# Patient Record
Sex: MALE | Race: BLACK OR AFRICAN AMERICAN | Employment: UNEMPLOYED | URBAN - METROPOLITAN AREA
[De-identification: names, ages, dates, MRNs, and addresses within clinical notes are randomized per-mention and may not be internally consistent; named-entity substitution may affect disease eponyms.]

---

## 2017-01-01 ENCOUNTER — OFFICE VISIT (OUTPATIENT)
Dept: CARDIOLOGY CLINIC | Age: 72
End: 2017-01-01

## 2017-01-01 VITALS — HEART RATE: 70 BPM | HEIGHT: 73 IN | DIASTOLIC BLOOD PRESSURE: 48 MMHG | SYSTOLIC BLOOD PRESSURE: 108 MMHG

## 2017-01-01 DIAGNOSIS — I44.2 ATRIOVENTRICULAR BLOCK, COMPLETE (HCC): ICD-10-CM

## 2017-01-01 DIAGNOSIS — Z95.0 CARDIAC PACEMAKER: ICD-10-CM

## 2017-01-01 DIAGNOSIS — I10 ESSENTIAL HYPERTENSION, BENIGN: ICD-10-CM

## 2017-01-01 DIAGNOSIS — I42.9 CARDIOMYOPATHY, UNSPECIFIED TYPE (HCC): Primary | ICD-10-CM

## 2017-01-01 DIAGNOSIS — Z71.89 DNR (DO NOT RESUSCITATE) DISCUSSION: ICD-10-CM

## 2017-01-01 RX ORDER — CARVEDILOL 3.12 MG/1
3.12 TABLET ORAL 2 TIMES DAILY WITH MEALS
Qty: 60 TAB | Refills: 3 | Status: SHIPPED | OUTPATIENT
Start: 2017-01-01

## 2017-03-02 LAB — LDL-C, EXTERNAL: 70

## 2017-03-10 ENCOUNTER — OFFICE VISIT (OUTPATIENT)
Dept: CARDIOLOGY CLINIC | Age: 72
End: 2017-03-10

## 2017-03-10 VITALS
HEIGHT: 73 IN | SYSTOLIC BLOOD PRESSURE: 168 MMHG | BODY MASS INDEX: 24.52 KG/M2 | HEART RATE: 70 BPM | WEIGHT: 185 LBS | DIASTOLIC BLOOD PRESSURE: 82 MMHG

## 2017-03-10 DIAGNOSIS — I50.32 CHRONIC DIASTOLIC HEART FAILURE (HCC): Primary | ICD-10-CM

## 2017-03-10 DIAGNOSIS — Z95.0 CARDIAC PACEMAKER: ICD-10-CM

## 2017-03-10 DIAGNOSIS — I10 ESSENTIAL HYPERTENSION, BENIGN: ICD-10-CM

## 2017-03-10 DIAGNOSIS — R55 SYNCOPE, UNSPECIFIED SYNCOPE TYPE: ICD-10-CM

## 2017-03-10 RX ORDER — SODIUM BICARBONATE 650 MG/1
650 TABLET ORAL 2 TIMES DAILY
COMMUNITY

## 2017-03-10 NOTE — PROGRESS NOTES
HISTORY OF PRESENT ILLNESS  Magali Lee is a 73 y.o. male. HPI Comments: 3/17 seen due to syncopal episodes. Not remembered by pt. Lasting about 10 mts. Had good BP and sugar per wife. No associated symptoms. Wife can't remember if pt was stiff or flaccid. No incontinence. No tongue biting. No witnessed shaking  Patient denies significant chest pain, SOB, palpitations, edema, dizziness      CHF   The history is provided by the medical records. This is a chronic problem. Pertinent negatives include no chest pain, no abdominal pain, no headaches and no shortness of breath. Hypertension   The history is provided by the medical records. This is a chronic problem. Pertinent negatives include no chest pain, no abdominal pain, no headaches and no shortness of breath. Cholesterol Problem   The history is provided by the medical records. This is a chronic problem. Pertinent negatives include no chest pain, no abdominal pain, no headaches and no shortness of breath. Hypotension   The history is provided by the patient and medical records (last o/v and low nl today again; 11/16 resolved). Pertinent negatives include no chest pain, no abdominal pain, no headaches and no shortness of breath. Review of Systems   Constitutional: Negative for chills, diaphoresis, fever, malaise/fatigue and weight loss. HENT: Negative for ear pain and hearing loss. Eyes: Negative for blurred vision. Respiratory: Negative for cough, hemoptysis, sputum production, shortness of breath, wheezing and stridor. Cardiovascular: Negative for chest pain, palpitations, orthopnea, claudication, leg swelling, syncope and PND. Gastrointestinal: Negative for abdominal pain, heartburn, nausea and vomiting. Musculoskeletal: Negative for myalgias and neck pain. Skin: Negative for rash. Neurological: Negative for dizziness, tingling, tremors, focal weakness, loss of consciousness, weakness and headaches.         Not responding appropriately   Psychiatric/Behavioral: Negative for depression and suicidal ideas. Allergies   Allergen Reactions    Shellfish Derived Not Reported This Time       Past Medical History:   Diagnosis Date    Atrioventricular block, complete ([address]     Cardiac pacemaker     Cerebrovascular disease     1995, 2003-residual left hemiparesis    Chronic diastolic heart failure ([address]     Congestive heart failure ([address]     Essential hypertension     Essential hypertension, benign     Hypertension     Myocardial infarct ([address] 1993, 2004    Other and unspecified hyperlipidemia 7/11    low density lipoproteins (LDLs) are at goal, triglycerides are at goal, high density lipoproteins (HDLs) are at goal.    Renal insufficiency     Shortness of breath     Stroke ([address]     1995, 2003-residual left hemiparesis    Type II or unspecified type diabetes mellitus without mention of complication, not stated as uncontrolled        Family History   Problem Relation Age of Onset    Stroke Mother 39     cerebrovascular disease; cause of death was a cerebrovascular accident    Stroke Father 61    Heart Attack Neg Hx        Social History   Substance Use Topics    Smoking status: Former Smoker     Quit date: 7/14/2009    Smokeless tobacco: Never Used    Alcohol use No        Current Outpatient Prescriptions   Medication Sig    sodium bicarbonate 650 mg tablet Take 650 mg by mouth two (2) times a day.  bisacodyl (DULCOLAX) 10 mg suppository Insert 10 mg into rectum as needed.  NA PHOS,M-B/NA PHOS,DI-BA (ENEMA DISPOSABLE RE) Insert  into rectum.  ferrous sulfate 324 mg (65 mg iron) tablet Take  by mouth Daily (before breakfast).  azithromycin (AZASITE) 1 % ophthalmic solution Administer 1 Drop to both eyes two (2) times a day.  atorvastatin (LIPITOR) 10 mg tablet Take  by mouth daily.     polyvinyl alcohol (LIQUIFILM TEARS) 1.4 % ophthalmic solution Administer 2 Drops to both eyes three (3) times daily as needed.  trimethoprim-polymyxin b (POLYTRIM) ophthalmic solution Administer 1 Drop to both eyes three (3) times daily.  tetrahydrozoline-peg 0.05-1 % drop Apply  to eye four (4) times daily.  albuterol-ipratropium (DUO-NEB) 2.5 mg-0.5 mg/3 ml nebu 3 mL by Nebulization route.  Cholecalciferol, Vitamin D3, (VITAMIN D3) 1,000 unit cap Take  by mouth. 2 tablets daily    insulin detemir (LEVEMIR) 100 unit/mL injection 48 Units by SubCUTAneous route nightly.  allopurinol (ZYLOPRIM) 100 mg tablet Take  by mouth daily.  latanoprost (XALATAN) 0.005 % ophthalmic solution Administer 1 Drop to both eyes nightly.  aspirin 81 mg tablet Take 81 mg by mouth.  timolol (TIMOPTIC OCUDOSE, PF,) 0.5 % Dpet Administer 1 Drop to both eyes two (2) times a day.  paricalcitol (ZEMPLAR) 1 mcg capsule Take 1 mcg by mouth daily.  predniSONE (DELTASONE) 2.5 mg tablet Take 2.5 mg by mouth daily (with breakfast).  ezetimibe (ZETIA) 10 mg tablet Take 10 mg by mouth daily.  acetaminophen (TYLENOL) 325 mg tablet Take 500 mg by mouth two (2) times a day. No current facility-administered medications for this visit. Past Surgical History:   Procedure Laterality Date    CARDIAC SURG PROCEDURE UNLIST      cardiac pacemaker; please get remote checks for pacer or ICD every 3 months and office check in 1 year    HX PACEMAKER  2003; gen change 4/14       Diagnostic Studies: Old records reviewed and show as follows  EKG tracings reviewed by me today. CARDIOLOGY STUDIES 2/20/2014 4/21/2007   Myocardial Perfusion Scan Result - nl scan, mild partial reversible defect of inferior wall, EF 77%   Echocardiogram - Complete Result 60%EF, sev LVH, mild dil LA/RA/RV 4/07 EF 55%, mod DD; 4/07 EF 50%       Visit Vitals    /82    Pulse 70    Ht 6' 1\" (1.854 m)    Wt 83.9 kg (185 lb)    BMI 24.41 kg/m2       Mr. Gayle Cordero has a reminder for a \"due or due soon\" health maintenance.  I have asked that he contact his primary care provider for follow-up on this health maintenance. Physical Exam   Constitutional: He is oriented to person, place, and time. He appears well-developed and well-nourished. No distress. Wheel chair bound   HENT:   Head: Normocephalic and atraumatic. Eyes: Right eye exhibits no discharge. Left eye exhibits no discharge. No scleral icterus. Neck: Neck supple. No JVD present. Carotid bruit is not present. No thyromegaly present. Cardiovascular: Normal rate, regular rhythm, S1 normal, S2 normal, normal heart sounds and intact distal pulses. Exam reveals no gallop and no friction rub. No murmur heard. Pulmonary/Chest: Effort normal. He has no wheezes. He has rales (diffuse b/l scattered). Abdominal: Soft. He exhibits no mass. There is no tenderness. Musculoskeletal: He exhibits no edema. Neurological: He is alert and oriented to person, place, and time. Left suzanne   Skin: Skin is warm and dry. No rash noted. Psychiatric: He has a normal mood and affect. His behavior is normal.       ASSESSMENT and Edita Gutierrezlucho was seen today for chf, hypertension and cholesterol problem. Diagnoses and all orders for this visit:    Chronic diastolic heart failure ([address]  Comments:  w/c bound  Orders:  -     2D ECHO COMPLETE ADULT (TTE); Future    Cardiac pacemaker-DDD  Comments:  3/17(rare NSVT-longest for 10 secs) nl function    Orders:  -     PROGRAM EVAL (IN PERSON) IMPLANT DEVICE,PACEMAKER,MULT LEAD    Essential hypertension, benign  Comments:  3/17 get BP bid at [facility] and decide in 1-2 wks for meds; 11/16 no meds now for HTN    Syncope, unspecified syncope type  Comments:  3/17 unlikely cardiac as BP good during/just after episode and no arrhythmias noted on pacer check today-check echo  meds for seizures per Dr Ana Maria Camarena  Orders:  -     2D ECHO COMPLETE ADULT (TTE); Future        Pertinent laboratory and test data reviewed and discussed with patient.   See patient instructions also for other medical advice given    Medications Discontinued During This Encounter   Medication Reason    magnesium oxide (MAG-OX) 400 mg tablet Discontinued by Another Clinician       Follow-up Disposition:  Return in about 3 months (around 6/10/2017), or if symptoms worsen or fail to improve, for post test.

## 2017-03-10 NOTE — MR AVS SNAPSHOT
Visit Information Date & Time Provider Department Dept. Phone Encounter #  
 3/10/2017 10:45 AM Thad Wang MD [phone] Follow-up Instructions Return in about 3 months (around 6/10/2017), or if symptoms worsen or fail to improve, for post test.  
  
Your Appointments 3/20/2017 12:45 PM  
PROCEDURE with [place] ECHO [facility] Appt Note: Echo/[place] [address] [facility] [address]  
  
   
 [address] [id number] [address] [address]  
  
    
 6/16/2017 10:45 AM  
ESTABLISHED PATIENT with Thad Wang MD  
[facility] Appt Note: 3 month follow up post echo  
 [address] [facility] [address]  
  
   
 [address] [id number] [address] [address] Upcoming Health Maintenance Date Due Hepatitis C Screening 1945 HEMOGLOBIN A1C Q6M 1945 FOOT EXAM Q1 7/18/1957 MICROALBUMIN Q1 7/18/1957 EYE EXAM RETINAL OR DILATED Q1 7/18/1957 DTaP/Tdap/Td series (1 - Tdap) 7/18/1968 FOBT Q 1 YEAR AGE 50-75 7/18/1997 ZOSTER VACCINE AGE 60> 7/18/2007 GLAUCOMA SCREENING Q2Y 7/18/2012 Pneumococcal 65+ Low/Medium Risk (1 of 2 - PCV13) 7/18/2012 MEDICARE YEARLY EXAM 7/18/2012 LIPID PANEL Q1 2/23/2016 INFLUENZA AGE 9 TO ADULT 8/1/2016 Allergies as of 3/10/2017  Review Complete On: 3/10/2017 By: Thad Wang MD  
  
 Severity Noted Reaction Type Reactions Shellfish Derived    Not Reported This Time Current Immunizations  Never Reviewed No immunizations on file. Not reviewed this visit You Were Diagnosed With   
  
 Codes Comments Chronic diastolic heart failure (HCC)    -  Primary ICD-10-CM: I50.32 
ICD-9-CM: 428.32 w/c bound Cardiac pacemaker     ICD-10-CM: Z95.0 ICD-9-CM: V45.01 3/17(rare NSVT-longest for 10 secs) nl function Essential hypertension, benign     ICD-10-CM: I10 
ICD-9-CM: 401.1 3/17 get BP bid at [facility] and decide in 1-2 wks for meds; 11/16 no meds now for HTN Syncope, unspecified syncope type     ICD-10-CM: R55 
ICD-9-CM: 780.2 3/17 unlikely cardiac as BP good during/just after episode and no arrhythmias noted on pacer check today-check echo 
meds for seizures per Dr Louis Talley Vitals BP Pulse Height(growth percentile) Weight(growth percentile) BMI Smoking Status 168/82 70 6' 1\" (1.854 m) 185 lb (83.9 kg) 24.41 kg/m2 Former Smoker Vitals History BMI and BSA Data Body Mass Index Body Surface Area  
 24.41 kg/m 2 2.08 m 2 Preferred Pharmacy Pharmacy Name Phone [address], [address] [address] [phone] Your Updated Medication List  
  
   
This list is accurate as of: 3/10/17 11:26 AM.  Always use your most recent med list.  
  
  
  
  
 albuterol-ipratropium 2.5 mg-0.5 mg/3 ml Nebu Commonly known as:  DUO-NEB  
3 mL by Nebulization route. allopurinol 100 mg tablet Commonly known as:  Ollen Epley Take  by mouth daily. aspirin 81 mg tablet Take 81 mg by mouth. atorvastatin 10 mg tablet Commonly known as:  LIPITOR Take  by mouth daily. azithromycin 1 % ophthalmic solution Commonly known as:  Angy Montes De Oca Administer 1 Drop to both eyes two (2) times a day. bisacodyl 10 mg suppository Commonly known as:  DULCOLAX Insert 10 mg into rectum as needed. ENEMA DISPOSABLE RE Insert  into rectum. ferrous sulfate 324 mg (65 mg iron) tablet Take  by mouth Daily (before breakfast). LEVEMIR 100 unit/mL injection Generic drug:  insulin detemir 48 Units by SubCUTAneous route nightly. polyvinyl alcohol 1.4 % ophthalmic solution Commonly known as:  Ananda Sarkar Administer 2 Drops to both eyes three (3) times daily as needed. predniSONE 2.5 mg tablet Commonly known as:  Paulanetta Moment Take 2.5 mg by mouth daily (with breakfast). sodium bicarbonate 650 mg tablet Take 650 mg by mouth two (2) times a day. tetrahydrozoline-peg 0.05-1 % Drop Apply  to eye four (4) times daily. TIMOPTIC OCUDOSE (PF) 0.5 % Dpet Generic drug:  timolol Administer 1 Drop to both eyes two (2) times a day. trimethoprim-polymyxin b ophthalmic solution Commonly known as:  POLYTRIM Administer 1 Drop to both eyes three (3) times daily. TYLENOL 325 mg tablet Generic drug:  acetaminophen Take 500 mg by mouth two (2) times a day. VITAMIN D3 1,000 unit Cap Generic drug:  cholecalciferol Take  by mouth. 2 tablets daily XALATAN 0.005 % ophthalmic solution Generic drug:  latanoprost  
Administer 1 Drop to both eyes nightly. ZEMPLAR 1 mcg capsule Generic drug:  paricalcitol Take 1 mcg by mouth daily. ZETIA 10 mg tablet Generic drug:  ezetimibe Take 10 mg by mouth daily. We Performed the Following PROGRAM EVAL (IN PERSON) IMPLANT DEVICE,PACEMAKER,MULT LEAD [id number] CPT(R)] Follow-up Instructions Return in about 3 months (around 6/10/2017), or if symptoms worsen or fail to improve, for post test.  
  
To-Do List   
 Around 03/13/2017 Cardiac Services:  2D ECHO COMPLETE ADULT (TTE) Patient Instructions Medications Discontinued During This Encounter Medication Reason  magnesium oxide (MAG-OX) 400 mg tablet Discontinued by Another Clinician Orders Placed This Encounter  2D ECHO COMPLETE ADULT (TTE) Standing Status:   Future Standing Expiration Date:   9/6/2017 Order Specific Question:   Reason for Exam: Answer:   syncope  PROGRAM EVAL (IN PERSON) IMPLANT DEVICE,PACEMAKER,MULT LEAD Order Specific Question:   Reason for Exam: Answer:   f/u pacer After the recommended changes have been made in blood pressure medicines, patient advised to keep BP/HR(pulse rate) chart twice daily and bring us results in next 2 weeks or so. Patient may send the results via \"My Chart\" if desired. Please rest for 5-10 minutes before checking blood pressure Fainting: Care Instructions Your Care Instructions When you faint, or pass out, you lose consciousness for a short time. A brief drop in blood flow to the brain often causes it. When you fall or lie down, more blood flows to your brain and you regain consciousness. Emotional stress, pain, or overheatingespecially if you have been standingcan make you faint. In these cases, fainting is usually not serious. But fainting can be a sign of a more serious problem. Your doctor may want you to have more tests to rule out other causes. The treatment you need depends on the reason why you fainted. The doctor has checked you carefully, but problems can develop later. If you notice any problems or new symptoms, get medical treatment right away. Follow-up care is a key part of your treatment and safety. Be sure to make and go to all appointments, and call your doctor if you are having problems. It's also a good idea to know your test results and keep a list of the medicines you take. How can you care for yourself at home? · Drink plenty of fluids to prevent dehydration. If you have kidney, heart, or liver disease and have to limit fluids, talk with your doctor before you increase your fluid intake. When should you call for help? Call 911 anytime you think you may need emergency care. For example, call if: 
· You have symptoms of a heart problem. These may include: ¨ Chest pain or pressure. ¨ Severe trouble breathing. ¨ A fast or irregular heartbeat. ¨ Lightheadedness or sudden weakness. ¨ Coughing up pink, foamy mucus. ¨ Passing out. After you call 911, the  may tell you to chew 1 adult-strength or 2 to 4 low-dose aspirin. Wait for an ambulance. Do not try to drive yourself. · You have symptoms of a stroke. These may include: 
¨ Sudden numbness, tingling, weakness, or loss of movement in your face, arm, or leg, especially on only one side of your body. ¨ Sudden vision changes. ¨ Sudden trouble speaking. ¨ Sudden confusion or trouble understanding simple statements. ¨ Sudden problems with walking or balance. ¨ A sudden, severe headache that is different from past headaches. · You passed out (lost consciousness) again. Watch closely for changes in your health, and be sure to contact your doctor if: 
· You do not get better as expected. Where can you learn more? Go to http://nimco-madan.info/. Enter R209 in the search box to learn more about \"Fainting: Care Instructions. \" Current as of: May 27, 2016 Content Version: 11.1 © 9417-2651 [employer]wise, [employer]orporated. Care instructions adapted under license by [employer] (which disclaims liability or warranty for this information). If you have questions about a medical condition or this instruction, always ask your healthcare professional. [place] [address] any warranty or liability for your use of this information. Introducing [facility]! [facility] introduces [employer]oster patient portal. Now you can access parts of your medical record, email your doctor's office, and request medication refills online. 1. In your internet browser, go to [email or web address] 2. Click on the First Time User? Click Here link in the Sign In box. You will see the New Member Sign Up page. 3. Enter your PodPoster Access Code exactly as it appears below. You will not need to use this code after youve completed the sign-up process. If you do not sign up before the expiration date, you must request a new code. · PodPoster Access Code: [email or web address] Expires: 6/8/2017 10:08 AM 
 
4.  Enter the last four digits of your Social Security Number (xxxx) and Date of Birth (mm/dd/yyyy) as indicated and click Submit. You will be taken to the next sign-up page. 5. Create a [employer] ID. This will be your [employer] login ID and cannot be changed, so think of one that is secure and easy to remember. 6. Create a [employer] password. You can change your password at any time. 7. Enter your Password Reset Question and Answer. This can be used at a later time if you forget your password. 8. Enter your e-mail address. You will receive e-mail notification when new information is available in [address] 9. Click Sign Up. You can now view and download portions of your medical record. 10. Click the Download Summary menu link to download a portable copy of your medical information. If you have questions, please visit the Frequently Asked Questions section of the [employer] website. Remember, [employer] is NOT to be used for urgent needs. For medical emergencies, dial 911. Now available from your iPhone and Android! Please provide this summary of care documentation to your next provider. Your primary care clinician is listed as Lesvia Samuel. If you have any questions after today's visit, please call [phone].

## 2017-03-10 NOTE — PROGRESS NOTES
1. Have you been to the ER, urgent care clinic since your last visit? Hospitalized since your last visit?     no    2. Have you seen or consulted any other health care providers outside of the [address] [place] [address] since your last visit? Include any pap smears or colon screening. Yes kidney dr, pcp see pt in nursing home    3. Since your last visit, have you had any of the following symptoms? No cardiac symptoms    4. Have you had any blood work, X-rays or cardiac testing? Yes, facility            5.  Where do you normally have your labs drawn? Facility     6. Do you need any refills today?    no

## 2017-03-10 NOTE — LETTER
Fredy Flatten 1945 
 
3/10/2017 Dear MD Sarai Danielle MD 
 
I had the pleasure of evaluating  Mr. Edi Mcdonough in office today. Below are the relevant portions of my assessment and plan of care. ICD-10-CM ICD-9-CM 1. Chronic diastolic heart failure (HCC) I50.32 428.32 2D ECHO COMPLETE ADULT (TTE)  
 w/c bound 2. Cardiac pacemaker-DDD Z95.0 V45.01 PROGRAM EVAL (IN PERSON) IMPLANT DEVICE,PACEMAKER,MULT LEAD  
 3/17(rare NSVT-longest for 10 secs) nl function 3. Essential hypertension, benign I10 401.1 3/17 get BP bid at [facility] and decide in 1-2 wks for meds; 11/16 no meds now for HTN 4. Syncope, unspecified syncope type R55 780.2 2D ECHO COMPLETE ADULT (TTE)  
 3/17 unlikely cardiac as BP good during/just after episode and no arrhythmias noted on pacer check today-check echo 
meds for seizures per Dr Mindi Giron Current Outpatient Prescriptions Medication Sig Dispense Refill  sodium bicarbonate 650 mg tablet Take 650 mg by mouth two (2) times a day.  bisacodyl (DULCOLAX) 10 mg suppository Insert 10 mg into rectum as needed.  NA PHOS,M-B/NA PHOS,DI-BA (ENEMA DISPOSABLE RE) Insert  into rectum.  ferrous sulfate 324 mg (65 mg iron) tablet Take  by mouth Daily (before breakfast).  azithromycin (AZASITE) 1 % ophthalmic solution Administer 1 Drop to both eyes two (2) times a day.  atorvastatin (LIPITOR) 10 mg tablet Take  by mouth daily.  polyvinyl alcohol (LIQUIFILM TEARS) 1.4 % ophthalmic solution Administer 2 Drops to both eyes three (3) times daily as needed.  trimethoprim-polymyxin b (POLYTRIM) ophthalmic solution Administer 1 Drop to both eyes three (3) times daily.  tetrahydrozoline-peg 0.05-1 % drop Apply  to eye four (4) times daily.  albuterol-ipratropium (DUO-NEB) 2.5 mg-0.5 mg/3 ml nebu 3 mL by Nebulization route.  Cholecalciferol, Vitamin D3, (VITAMIN D3) 1,000 unit cap Take  by mouth. 2 tablets daily  insulin detemir (LEVEMIR) 100 unit/mL injection 48 Units by SubCUTAneous route nightly.  allopurinol (ZYLOPRIM) 100 mg tablet Take  by mouth daily.  latanoprost (XALATAN) 0.005 % ophthalmic solution Administer 1 Drop to both eyes nightly.  aspirin 81 mg tablet Take 81 mg by mouth.  timolol (TIMOPTIC OCUDOSE, PF,) 0.5 % Dpet Administer 1 Drop to both eyes two (2) times a day.  paricalcitol (ZEMPLAR) 1 mcg capsule Take 1 mcg by mouth daily.  predniSONE (DELTASONE) 2.5 mg tablet Take 2.5 mg by mouth daily (with breakfast).  ezetimibe (ZETIA) 10 mg tablet Take 10 mg by mouth daily.  acetaminophen (TYLENOL) 325 mg tablet Take 500 mg by mouth two (2) times a day. Orders Placed This Encounter  2D ECHO COMPLETE ADULT (TTE) Standing Status:   Future Standing Expiration Date:   9/6/2017 Order Specific Question:   Reason for Exam: Answer:   syncope  PROGRAM EVAL (IN PERSON) IMPLANT DEVICE,PACEMAKER,MULT LEAD Order Specific Question:   Reason for Exam: Answer:   f/u pacer  sodium bicarbonate 650 mg tablet Sig: Take 650 mg by mouth two (2) times a day. If you have questions, please do not hesitate to call me. I look forward to following Mr. Idalmis Sweeney along with you. Sincerely, Beryl Chappell MD

## 2017-03-10 NOTE — PATIENT INSTRUCTIONS
Medications Discontinued During This Encounter   Medication Reason    magnesium oxide (MAG-OX) 400 mg tablet Discontinued by Another Clinician       Orders Placed This Encounter    2D ECHO COMPLETE ADULT (TTE)     Standing Status:   Future     Standing Expiration Date:   9/6/2017     Order Specific Question:   Reason for Exam:     Answer:   syncope    PROGRAM EVAL (IN PERSON) IMPLANT DEVICE,PACEMAKER,MULT LEAD     Order Specific Question:   Reason for Exam:     Answer:   f/u pacer     After the recommended changes have been made in blood pressure medicines, patient advised to keep BP/HR(pulse rate) chart twice daily and bring us results in next 2 weeks or so. Patient may send the results via \"My Chart\" if desired. Please rest for 5-10 minutes before checking blood pressure       Fainting: Care Instructions  Your Care Instructions    When you faint, or pass out, you lose consciousness for a short time. A brief drop in blood flow to the brain often causes it. When you fall or lie down, more blood flows to your brain and you regain consciousness. Emotional stress, pain, or overheatingespecially if you have been standingcan make you faint. In these cases, fainting is usually not serious. But fainting can be a sign of a more serious problem. Your doctor may want you to have more tests to rule out other causes. The treatment you need depends on the reason why you fainted. The doctor has checked you carefully, but problems can develop later. If you notice any problems or new symptoms, get medical treatment right away. Follow-up care is a key part of your treatment and safety. Be sure to make and go to all appointments, and call your doctor if you are having problems. It's also a good idea to know your test results and keep a list of the medicines you take. How can you care for yourself at home? · Drink plenty of fluids to prevent dehydration.  If you have kidney, heart, or liver disease and have to limit fluids, talk with your doctor before you increase your fluid intake. When should you call for help? Call 911 anytime you think you may need emergency care. For example, call if:  · You have symptoms of a heart problem. These may include:  ¨ Chest pain or pressure. ¨ Severe trouble breathing. ¨ A fast or irregular heartbeat. ¨ Lightheadedness or sudden weakness. ¨ Coughing up pink, foamy mucus. ¨ Passing out. After you call 911, the  may tell you to chew 1 adult-strength or 2 to 4 low-dose aspirin. Wait for an ambulance. Do not try to drive yourself. · You have symptoms of a stroke. These may include:  ¨ Sudden numbness, tingling, weakness, or loss of movement in your face, arm, or leg, especially on only one side of your body. ¨ Sudden vision changes. ¨ Sudden trouble speaking. ¨ Sudden confusion or trouble understanding simple statements. ¨ Sudden problems with walking or balance. ¨ A sudden, severe headache that is different from past headaches. · You passed out (lost consciousness) again. Watch closely for changes in your health, and be sure to contact your doctor if:  · You do not get better as expected. Where can you learn more? Go to http://nimco-madan.info/. Enter M438 in the search box to learn more about \"Fainting: Care Instructions. \"  Current as of: May 27, 2016  Content Version: 11.1  © 2845-1244 [employer]. Care instructions adapted under license by [employer] (which disclaims liability or warranty for this information). If you have questions about a medical condition or this instruction, always ask your healthcare professional. [place] [address] any warranty or liability for your use of this information.

## 2017-03-20 ENCOUNTER — CLINICAL SUPPORT (OUTPATIENT)
Dept: CARDIOLOGY CLINIC | Age: 72
End: 2017-03-20

## 2017-03-20 DIAGNOSIS — I50.32 CHRONIC DIASTOLIC HEART FAILURE (HCC): ICD-10-CM

## 2017-03-20 DIAGNOSIS — R55 SYNCOPE, UNSPECIFIED SYNCOPE TYPE: ICD-10-CM

## 2017-04-21 ENCOUNTER — OFFICE VISIT (OUTPATIENT)
Dept: CARDIOLOGY CLINIC | Age: 72
End: 2017-04-21

## 2017-04-21 VITALS — DIASTOLIC BLOOD PRESSURE: 72 MMHG | HEART RATE: 67 BPM | SYSTOLIC BLOOD PRESSURE: 154 MMHG | HEIGHT: 73 IN

## 2017-04-21 DIAGNOSIS — R55 SYNCOPE, UNSPECIFIED SYNCOPE TYPE: ICD-10-CM

## 2017-04-21 DIAGNOSIS — Z95.0 CARDIAC PACEMAKER: ICD-10-CM

## 2017-04-21 DIAGNOSIS — Z71.89 DNR (DO NOT RESUSCITATE) DISCUSSION: ICD-10-CM

## 2017-04-21 DIAGNOSIS — I42.9 CARDIOMYOPATHY, UNSPECIFIED TYPE (HCC): Primary | ICD-10-CM

## 2017-04-21 RX ORDER — ADHESIVE BANDAGE
30 BANDAGE TOPICAL DAILY PRN
COMMUNITY

## 2017-04-21 RX ORDER — LISINOPRIL 2.5 MG/1
2.5 TABLET ORAL DAILY
Qty: 30 TAB | Refills: 1 | Status: SHIPPED | OUTPATIENT
Start: 2017-04-21

## 2017-04-21 RX ORDER — LISINOPRIL 2.5 MG/1
2.5 TABLET ORAL DAILY
Qty: 30 TAB | Refills: 1 | Status: SHIPPED | OUTPATIENT
Start: 2017-04-21 | End: 2017-04-21 | Stop reason: SDUPTHER

## 2017-04-21 NOTE — PROGRESS NOTES
HISTORY OF PRESENT ILLNESS  Luh Yanez is a 73 y.o. male. HPI Comments: 3/17 seen due to syncopal episodes. Not remembered by pt. Lasting about 10 mts. Had good BP and sugar per wife. No associated symptoms. Wife can't remember if pt was stiff or flaccid. No incontinence. No tongue biting. No witnessed shaking  Patient denies significant chest pain, SOB, palpitations, edema, dizziness      CHF   The history is provided by the medical records. This is a chronic problem. Pertinent negatives include no chest pain, no abdominal pain, no headaches and no shortness of breath. Hypertension   The history is provided by the medical records. This is a chronic problem. Pertinent negatives include no chest pain, no abdominal pain, no headaches and no shortness of breath. Cholesterol Problem   The history is provided by the medical records. This is a chronic problem. Pertinent negatives include no chest pain, no abdominal pain, no headaches and no shortness of breath. Review of Systems   Constitutional: Negative for chills, diaphoresis, fever, malaise/fatigue and weight loss. HENT: Negative for ear pain and hearing loss. Eyes: Negative for blurred vision. Respiratory: Negative for cough, hemoptysis, sputum production, shortness of breath, wheezing and stridor. Cardiovascular: Negative for chest pain, palpitations, orthopnea, claudication, leg swelling and PND. Gastrointestinal: Negative for abdominal pain, heartburn, nausea and vomiting. Musculoskeletal: Negative for myalgias and neck pain. Skin: Negative for rash. Neurological: Negative for dizziness, tingling, tremors, focal weakness, loss of consciousness, weakness and headaches. Not responding appropriately   Psychiatric/Behavioral: Negative for depression and suicidal ideas.      Allergies   Allergen Reactions    Shellfish Derived Not Reported This Time       Past Medical History:   Diagnosis Date    Atrioventricular block, complete [facility])     Cardiac pacemaker     Cerebrovascular disease     1995, 2003-residual left hemiparesis    Chronic diastolic heart failure (HCC)     [facility] failure ([facility] [address]     Essential hypertension     Essential hypertension, benign     Hypertension     Myocardial infarct ([facility] [address] 1993, 2004    Other and unspecified hyperlipidemia 7/11    low density lipoproteins (LDLs) are at goal, triglycerides are at goal, high density lipoproteins (HDLs) are at goal.    Renal insufficiency     Shortness of breath     Stroke ([facility] [address]     1995, 2003-residual left hemiparesis    Syncope 3/10/2017    3/17 unlikely cardiac as BP good during/just after episode and no arrhythmias noted on pacer check today    Type II or unspecified type diabetes mellitus without mention of complication, not stated as uncontrolled        Family History   Problem Relation Age of Onset    Stroke Mother 39     cerebrovascular disease; cause of death was a cerebrovascular accident    Stroke Father 61    Heart Attack Neg Hx        Social History   Substance Use Topics    Smoking status: Former Smoker     Quit date: 7/14/2009    Smokeless tobacco: Never Used    Alcohol use No        Current Outpatient Prescriptions   Medication Sig    magnesium hydroxide (DAHL MILK OF MAGNESIA) 400 mg/5 mL suspension Take 30 mL by mouth daily as needed for Constipation.  sodium bicarbonate 650 mg tablet Take 650 mg by mouth two (2) times a day.  bisacodyl (DULCOLAX) 10 mg suppository Insert 10 mg into rectum as needed.  NA PHOS,M-B/NA PHOS,DI-BA (ENEMA DISPOSABLE RE) Insert  into rectum.  ferrous sulfate 324 mg (65 mg iron) tablet Take  by mouth Daily (before breakfast).  azithromycin (AZASITE) 1 % ophthalmic solution Administer 1 Drop to both eyes two (2) times a day.  atorvastatin (LIPITOR) 10 mg tablet Take  by mouth daily.     polyvinyl alcohol (LIQUIFILM TEARS) 1.4 % ophthalmic solution Administer 2 Drops to both eyes three (3) times daily as needed.  trimethoprim-polymyxin b (POLYTRIM) ophthalmic solution Administer 1 Drop to both eyes three (3) times daily.  tetrahydrozoline-peg 0.05-1 % drop Apply  to eye four (4) times daily.  Cholecalciferol, Vitamin D3, (VITAMIN D3) 1,000 unit cap Take  by mouth. 2 tablets daily    insulin detemir (LEVEMIR) 100 unit/mL injection 48 Units by SubCUTAneous route nightly.  allopurinol (ZYLOPRIM) 100 mg tablet Take  by mouth daily.  latanoprost (XALATAN) 0.005 % ophthalmic solution Administer 1 Drop to both eyes nightly.  aspirin 81 mg tablet Take 81 mg by mouth.  timolol (TIMOPTIC OCUDOSE, PF,) 0.5 % Dpet Administer 1 Drop to both eyes two (2) times a day.  paricalcitol (ZEMPLAR) 1 mcg capsule Take 1 mcg by mouth daily.  predniSONE (DELTASONE) 2.5 mg tablet Take 2.5 mg by mouth daily (with breakfast).  ezetimibe (ZETIA) 10 mg tablet Take 10 mg by mouth daily.  acetaminophen (TYLENOL) 325 mg tablet Take 500 mg by mouth two (2) times a day. No current facility-administered medications for this visit. Past Surgical History:   Procedure Laterality Date    CARDIAC SURG PROCEDURE UNLIST      cardiac pacemaker; please get remote checks for pacer or ICD every 3 months and office check in 1 year    HX PACEMAKER  2003; gen change 4/14       Diagnostic Studies: Old records reviewed and show as follows  EKG tracings reviewed by me today. CARDIOLOGY STUDIES 2/20/2014 4/21/2007   Myocardial Perfusion Scan Result - nl scan, mild partial reversible defect of inferior wall, EF 77%   Echocardiogram - Complete Result 60%EF, sev LVH, mild dil LA/RA/RV 4/07 EF 55%, mod DD; 4/07 EF 50%   3/17 ECHO  SUMMARY:  Procedure information: Echocardiographic views were limited by restricted  patient mobility. Patient was scanned in a wheelchair. Left ventricle: Systolic function was moderately reduced. Ejection  fraction was estimated in the range of 35 % to 40 %. There was diffuse  hypokinesis. Wall thickness was moderately to markedly increased. Doppler  parameters were consistent with abnormal left ventricular relaxation  (grade 1 diastolic dysfunction). Right ventricle: The ventricle was mildly to moderately dilated. Mitral valve: There was mild annular calcification. Tricuspid valve: There was mild regurgitation. Pulmonic valve: There was mild regurgitation. COMPARISONS:  Comparison was made with the previous study of 20-Feb-2014. LV overall  function has decreased from 60 % to 40 %. Visit Vitals    /72    Pulse 67    Ht 6' 1\" (1.854 m)       Mr. Julisa Ramos has a reminder for a \"due or due soon\" health maintenance. I have asked that he contact his primary care provider for follow-up on this health maintenance. Physical Exam   Constitutional: He is oriented to person, place, and time. He appears well-developed and well-nourished. No distress. Wheel chair bound   HENT:   Head: Normocephalic and atraumatic. Eyes: Right eye exhibits no discharge. Left eye exhibits no discharge. No scleral icterus. Neck: Neck supple. No JVD present. Carotid bruit is not present. No thyromegaly present. Cardiovascular: Normal rate, regular rhythm, S1 normal, S2 normal, normal heart sounds and intact distal pulses. Exam reveals no gallop and no friction rub. No murmur heard. Pulmonary/Chest: Effort normal. He has no wheezes. He has no rales. Abdominal: Soft. He exhibits no mass. There is no tenderness. Musculoskeletal: He exhibits no edema. Neurological: He is alert and oriented to person, place, and time. Left suzanne   Skin: Skin is warm and dry. No rash noted. Psychiatric: He has a normal mood and affect. His behavior is normal.       ADA and Kelvin Sanchez was seen today for chf and hypertension.     Diagnoses and all orders for this visit:    Cardiomyopathy, unspecified type  Comments:  3/17 EF 40%, pt w/c bound and not a candidate for any invasive w/u. Will avoid stress test also as no CP  try ace i; watch for low BP  Orders:  -     Discontinue: lisinopril (PRINIVIL, ZESTRIL) 2.5 mg tablet; Take 1 Tab by mouth daily. Indications: Chronic Heart Failure  -     METABOLIC PANEL, BASIC; Future  -     lisinopril (PRINIVIL, ZESTRIL) 2.5 mg tablet; Take 1 Tab by mouth daily. Indications: Chronic Heart Failure    Cardiac pacemaker-DDD  Comments:  3/17(rare NSVT-longest for 10 secs) nl function      Syncope, unspecified syncope type  Comments:  4/17 no seizure meds; 3/17 unlikely cardiac as BP good during/just after episode and no arrhythmias noted on pacer check today; meds for seizures per Dr Lucy Clemons    DNR (do not resuscitate) discussion  Comments:  4/17 with pt and sister. Sister will wife of pt          Pertinent laboratory and test data reviewed and discussed with patient.   See patient instructions also for other medical advice given    Medications Discontinued During This Encounter   Medication Reason    albuterol-ipratropium (DUO-NEB) 2.5 mg-0.5 mg/3 ml nebu Discontinued by Another Clinician    NA PHOS,M-B/NA PHOS,DI-BA (ENEMA DISPOSABLE RE) Other    lisinopril (PRINIVIL, ZESTRIL) 2.5 mg tablet Reorder       Follow-up Disposition:  Return in about 3 months (around 7/21/2017), or if symptoms worsen or fail to improve, for post test.

## 2017-04-21 NOTE — MR AVS SNAPSHOT
Visit Information Date & Time Provider Department Dept. Phone Encounter #  
 4/21/2017 11:45 AM Torri Nur MD [facility] [phone] Follow-up Instructions Return in about 3 months (around 7/21/2017), or if symptoms worsen or fail to improve, for post test.  
  
Your Appointments 6/16/2017 10:45 AM  
ESTABLISHED PATIENT with Torri Nur MD  
[facility] Appt Note: 3 month follow up post echo  
 [address] [facility] [address]  
  
   
 [address] [address] [place] [address] Upcoming Health Maintenance Date Due Hepatitis C Screening 1945 HEMOGLOBIN A1C Q6M 1945 FOOT EXAM Q1 7/18/1957 MICROALBUMIN Q1 7/18/1957 EYE EXAM RETINAL OR DILATED Q1 7/18/1957 DTaP/Tdap/Td series (1 - Tdap) 7/18/1968 FOBT Q 1 YEAR AGE 50-75 7/18/1997 ZOSTER VACCINE AGE 60> 7/18/2007 GLAUCOMA SCREENING Q2Y 7/18/2012 Pneumococcal 65+ Low/Medium Risk (1 of 2 - PCV13) 7/18/2012 MEDICARE YEARLY EXAM 7/18/2012 LIPID PANEL Q1 2/23/2016 INFLUENZA AGE 9 TO ADULT 8/1/2016 Allergies as of 4/21/2017  Review Complete On: 4/21/2017 By: Torri Nur MD  
  
 Severity Noted Reaction Type Reactions Shellfish Derived    Not Reported This Time Current Immunizations  Never Reviewed No immunizations on file. Not reviewed this visit You Were Diagnosed With   
  
 Codes Comments Cardiomyopathy, unspecified type    -  Primary ICD-10-CM: I42.9 ICD-9-CM: 425.4 3/17 EF 40%, pt w/c bound and not a candidate for any invasive w/u. Will avoid stress test also as no CP 
try ace i; watch for low BP Cardiac pacemaker     ICD-10-CM: Z95.0 ICD-9-CM: V45.01 3/17(rare NSVT-longest for 10 secs) nl function  Syncope, unspecified syncope type     ICD-10-CM: R55 
ICD-9-CM: 780.2 4/17 no seizure meds; 3/17 unlikely cardiac as BP good during/just after episode and no arrhythmias noted on pacer check today; meds for seizures per Dr Piedad Oliveros DNR (do not resuscitate) discussion     ICD-10-CM: Z71.89 ICD-9-CM: V65.49 4/17 with pt and sister. Sister will wife of pt Vitals BP Pulse Height(growth percentile) Smoking Status 154/72 67 6' 1\" (1.854 m) Former Smoker Preferred Pharmacy Pharmacy Name Phone [address], [address] [place] [address] [phone] Your Updated Medication List  
  
   
This list is accurate as of: 4/21/17 12:11 PM.  Always use your most recent med list.  
  
  
  
  
 allopurinol 100 mg tablet Commonly known as:  Fiore Chimera Take  by mouth daily. aspirin 81 mg tablet Take 81 mg by mouth. atorvastatin 10 mg tablet Commonly known as:  LIPITOR Take  by mouth daily. azithromycin 1 % ophthalmic solution Commonly known as:  Darnell Jarquin Administer 1 Drop to both eyes two (2) times a day. bisacodyl 10 mg suppository Commonly known as:  DULCOLAX Insert 10 mg into rectum as needed. ferrous sulfate 324 mg (65 mg iron) tablet Take  by mouth Daily (before breakfast). LEVEMIR 100 unit/mL injection Generic drug:  insulin detemir 48 Units by SubCUTAneous route nightly. lisinopril 2.5 mg tablet Commonly known as:  Walker Ku Take 1 Tab by mouth daily. Indications: Chronic Heart Failure DAHL MILK OF MAGNESIA 400 mg/5 mL suspension Generic drug:  magnesium hydroxide Take 30 mL by mouth daily as needed for Constipation. polyvinyl alcohol 1.4 % ophthalmic solution Commonly known as:  Hadley Walter Administer 2 Drops to both eyes three (3) times daily as needed. predniSONE 2.5 mg tablet Commonly known as:  Ofelia Ringer Take 2.5 mg by mouth daily (with breakfast). sodium bicarbonate 650 mg tablet Take 650 mg by mouth two (2) times a day. tetrahydrozoline-peg 0.05-1 % Drop Apply  to eye four (4) times daily. TIMOPTIC OCUDOSE (PF) 0.5 % Dpet Generic drug:  timolol Administer 1 Drop to both eyes two (2) times a day. trimethoprim-polymyxin b ophthalmic solution Commonly known as:  POLYTRIM Administer 1 Drop to both eyes three (3) times daily. TYLENOL 325 mg tablet Generic drug:  acetaminophen Take 500 mg by mouth two (2) times a day. VITAMIN D3 1,000 unit Cap Generic drug:  cholecalciferol Take  by mouth. 2 tablets daily XALATAN 0.005 % ophthalmic solution Generic drug:  latanoprost  
Administer 1 Drop to both eyes nightly. ZEMPLAR 1 mcg capsule Generic drug:  paricalcitol Take 1 mcg by mouth daily. ZETIA 10 mg tablet Generic drug:  ezetimibe Take 10 mg by mouth daily. Prescriptions Printed Refills  
 lisinopril (PRINIVIL, ZESTRIL) 2.5 mg tablet 1 Sig: Take 1 Tab by mouth daily. Indications: Chronic Heart Failure Class: Print Route: Oral  
  
Follow-up Instructions Return in about 3 months (around 7/21/2017), or if symptoms worsen or fail to improve, for post test.  
  
To-Do List   
 Around 04/28/2017 Lab:  METABOLIC PANEL, BASIC Patient Instructions After the recommended changes have been made in blood pressure medicines, patient advised to keep BP/HR(pulse rate) chart twice daily and bring us results in next 2 weeks or so. Patient may send the results via \"My Chart\" if desired. Please rest for 5-10 minutes before checking blood pressure Medications Discontinued During This Encounter Medication Reason  albuterol-ipratropium (DUO-NEB) 2.5 mg-0.5 mg/3 ml nebu Discontinued by Another Clinician  NA PHOS,M-B/NA PHOS,DI-BA (ENEMA DISPOSABLE RE) Other  lisinopril (PRINIVIL, ZESTRIL) 2.5 mg tablet Reorder Orders Placed This Encounter  METABOLIC PANEL, BASIC Standing Status:   Future Standing Expiration Date:   7/22/2017  DISCONTD: lisinopril (PRINIVIL, ZESTRIL) 2.5 mg tablet Sig: Take 1 Tab by mouth daily. Indications: Chronic Heart Failure Dispense:  30 Tab Refill:  1  
 lisinopril (PRINIVIL, ZESTRIL) 2.5 mg tablet Sig: Take 1 Tab by mouth daily. Indications: Chronic Heart Failure Dispense:  30 Tab Refill:  1 Advance Directives: Care Instructions Your Care Instructions An advance directive is a legal way to state your wishes at the end of your life. It tells your family and your doctor what to do if you can no longer say what you want. There are two main types of advance directives. You can change them any time that your wishes change. · A living will tells your family and your doctor your wishes about life support and other treatment. · A durable power of  for health care lets you name a person to make treatment decisions for you when you can't speak for yourself. This person is called a health care agent. If you do not have an advance directive, decisions about your medical care may be made by a doctor or a  who doesn't know you. It may help to think of an advance directive as a gift to the people who care for you. If you have one, they won't have to make tough decisions by themselves. Follow-up care is a key part of your treatment and safety. Be sure to make and go to all appointments, and call your doctor if you are having problems. It's also a good idea to know your test results and keep a list of the medicines you take. How can you care for yourself at home? · Discuss your wishes with your loved ones and your doctor. This way, there are no surprises. · Many states have a unique form. Or you might use a universal form that has been approved by many states. This kind of form can sometimes be completed and stored online.  Your electronic copy will then be available wherever you have a connection to the Internet. In most cases, doctors will respect your wishes even if you have a form from a different state. · You don't need a  to do an advance directive. But you may want to get legal advice. · Think about these questions when you prepare an advance directive: ¨ Who do you want to make decisions about your medical care if you are not able to? Many people choose a family member or close friend. ¨ Do you know enough about life support methods that might be used? If not, talk to your doctor so you understand. ¨ What are you most afraid of that might happen? You might be afraid of having pain, losing your independence, or being kept alive by machines. ¨ Where would you prefer to die? Choices include your home, a hospital, or a nursing home. ¨ Would you like to have information about hospice care to support you and your family? ¨ Do you want to donate organs when you die? ¨ Do you want certain Sikhism practices performed before you die? If so, put your wishes in the advance directive. · Read your advance directive every year, and make changes as needed. When should you call for help? Be sure to contact your doctor if you have any questions. Where can you learn more? Go to http://nimco-madan.info/. Enter R264 in the search box to learn more about \"Advance Directives: Care Instructions. \" Current as of: November 17, 2016 Content Version: 11.2 © 3186-2082 [employer]. Care instructions adapted under license by [employer] (which disclaims liability or warranty for this information). If you have questions about a medical condition or this instruction, always ask your healthcare professional. [address] any warranty or liability for your use of this information. Introducing [facility]!    
 [employer] introduces [employer] patient portal. Now you can access parts of your medical record, email your doctor's office, and request medication refills online. 1. In your internet browser, go to [email or web address] 2. Click on the First Time User? Click Here link in the Sign In box. You will see the New Member Sign Up page. 3. Enter your [employer] Access Code exactly as it appears below. You will not need to use this code after youve completed the sign-up process. If you do not sign up before the expiration date, you must request a new code. · [employer] Access Code: [email or web address] Expires: 6/8/2017 11:08 AM 
 
4. Enter the last four digits of your Social Security Number (xxxx) and Date of Birth (mm/dd/yyyy) as indicated and click Submit. You will be taken to the next sign-up page. 5. Create a [employer] ID. This will be your [employer] login ID and cannot be changed, so think of one that is secure and easy to remember. 6. Create a [employer] password. You can change your password at any time. 7. Enter your Password Reset Question and Answer. This can be used at a later time if you forget your password. 8. Enter your e-mail address. You will receive e-mail notification when new information is available in [address] 9. Click Sign Up. You can now view and download portions of your medical record. 10. Click the Download Summary menu link to download a portable copy of your medical information. If you have questions, please visit the Frequently Asked Questions section of the [employer] website. Remember, [employer] is NOT to be used for urgent needs. For medical emergencies, dial 911. Now available from your iPhone and Android! Please provide this summary of care documentation to your next provider. Your primary care clinician is listed as Odilon Christopher. If you have any questions after today's visit, please call [phone].

## 2017-04-21 NOTE — PROGRESS NOTES
1. Have you been to the ER, urgent care clinic since your last visit? Hospitalized since your last visit?     no    2. Have you seen or consulted any other health care providers outside of the [address] [place] [address] since your last visit? Include any pap smears or colon screening. No     3. Since your last visit, have you had any of the following symptoms? no          4. Have you had any blood work, X-rays or cardiac testing? Yes Where: Nursing home health         5. Where do you normally have your labs drawn? Nursing home health  6. Do you need any refills today?    no

## 2017-04-21 NOTE — LETTER
Kayleejyotsna Azul 1945 4/21/2017 Dear MD Roberta Mg Arm, MD 
 
I had the pleasure of evaluating  Mr. José Mae in office today. Below are the relevant portions of my assessment and plan of care. ICD-10-CM ICD-9-CM 1. Cardiomyopathy, unspecified type G43.5 675.1 METABOLIC PANEL, BASIC  
   lisinopril (PRINIVIL, ZESTRIL) 2.5 mg tablet DISCONTINUED: lisinopril (PRINIVIL, ZESTRIL) 2.5 mg tablet 3/17 EF 40%, pt w/c bound and not a candidate for any invasive w/u. Will avoid stress test also as no CP 
try ace i; watch for low BP 2. Cardiac pacemaker-DDD Z95.0 V45.01   
 3/17(rare NSVT-longest for 10 secs) nl function 3. Syncope, unspecified syncope type R55 780.2 4/17 no seizure meds; 3/17 unlikely cardiac as BP good during/just after episode and no arrhythmias noted on pacer check today; meds for seizures per Dr Rosalina James 4. DNR (do not resuscitate) discussion Z71.89 V65.49   
 4/17 with pt and sister. Sister will wife of pt 
  
 
 
Current Outpatient Prescriptions Medication Sig Dispense Refill  magnesium hydroxide (DAHL MILK OF MAGNESIA) 400 mg/5 mL suspension Take 30 mL by mouth daily as needed for Constipation.  lisinopril (PRINIVIL, ZESTRIL) 2.5 mg tablet Take 1 Tab by mouth daily. Indications: Chronic Heart Failure 30 Tab 1  
 sodium bicarbonate 650 mg tablet Take 650 mg by mouth two (2) times a day.  bisacodyl (DULCOLAX) 10 mg suppository Insert 10 mg into rectum as needed.  ferrous sulfate 324 mg (65 mg iron) tablet Take  by mouth Daily (before breakfast).  azithromycin (AZASITE) 1 % ophthalmic solution Administer 1 Drop to both eyes two (2) times a day.  atorvastatin (LIPITOR) 10 mg tablet Take  by mouth daily.  polyvinyl alcohol (LIQUIFILM TEARS) 1.4 % ophthalmic solution Administer 2 Drops to both eyes three (3) times daily as needed.     
 trimethoprim-polymyxin b (POLYTRIM) ophthalmic solution Administer 1 Drop to both eyes three (3) times daily.  tetrahydrozoline-peg 0.05-1 % drop Apply  to eye four (4) times daily.  Cholecalciferol, Vitamin D3, (VITAMIN D3) 1,000 unit cap Take  by mouth. 2 tablets daily  insulin detemir (LEVEMIR) 100 unit/mL injection 48 Units by SubCUTAneous route nightly.  allopurinol (ZYLOPRIM) 100 mg tablet Take  by mouth daily.  latanoprost (XALATAN) 0.005 % ophthalmic solution Administer 1 Drop to both eyes nightly.  aspirin 81 mg tablet Take 81 mg by mouth.  timolol (TIMOPTIC OCUDOSE, PF,) 0.5 % Dpet Administer 1 Drop to both eyes two (2) times a day.  paricalcitol (ZEMPLAR) 1 mcg capsule Take 1 mcg by mouth daily.  predniSONE (DELTASONE) 2.5 mg tablet Take 2.5 mg by mouth daily (with breakfast).  ezetimibe (ZETIA) 10 mg tablet Take 10 mg by mouth daily.  acetaminophen (TYLENOL) 325 mg tablet Take 500 mg by mouth two (2) times a day. Orders Placed This Encounter  METABOLIC PANEL, BASIC Standing Status:   Future Standing Expiration Date:   7/22/2017  
 magnesium hydroxide (DAHL MILK OF MAGNESIA) 400 mg/5 mL suspension Sig: Take 30 mL by mouth daily as needed for Constipation.  DISCONTD: lisinopril (PRINIVIL, ZESTRIL) 2.5 mg tablet Sig: Take 1 Tab by mouth daily. Indications: Chronic Heart Failure Dispense:  30 Tab Refill:  1  
 lisinopril (PRINIVIL, ZESTRIL) 2.5 mg tablet Sig: Take 1 Tab by mouth daily. Indications: Chronic Heart Failure Dispense:  30 Tab Refill:  1 If you have questions, please do not hesitate to call me. I look forward to following Mr. Eulalia Justice along with you. Sincerely, Cosme Bacon MD

## 2017-04-21 NOTE — PATIENT INSTRUCTIONS
After the recommended changes have been made in blood pressure medicines, patient advised to keep BP/HR(pulse rate) chart twice daily and bring us results in next 2 weeks or so. Patient may send the results via \"My Chart\" if desired. Please rest for 5-10 minutes before checking blood pressure  Medications Discontinued During This Encounter   Medication Reason    albuterol-ipratropium (DUO-NEB) 2.5 mg-0.5 mg/3 ml nebu Discontinued by Another Clinician    NA PHOS,M-B/NA PHOS,DI-BA (ENEMA DISPOSABLE RE) Other    lisinopril (PRINIVIL, ZESTRIL) 2.5 mg tablet Reorder       Orders Placed This Encounter    METABOLIC PANEL, BASIC     Standing Status:   Future     Standing Expiration Date:   7/22/2017    DISCONTD: lisinopril (PRINIVIL, ZESTRIL) 2.5 mg tablet     Sig: Take 1 Tab by mouth daily. Indications: Chronic Heart Failure     Dispense:  30 Tab     Refill:  1    lisinopril (PRINIVIL, ZESTRIL) 2.5 mg tablet     Sig: Take 1 Tab by mouth daily. Indications: Chronic Heart Failure     Dispense:  30 Tab     Refill:  1          Advance Directives: Care Instructions  Your Care Instructions  An advance directive is a legal way to state your wishes at the end of your life. It tells your family and your doctor what to do if you can no longer say what you want. There are two main types of advance directives. You can change them any time that your wishes change. · A living will tells your family and your doctor your wishes about life support and other treatment. · A durable power of  for health care lets you name a person to make treatment decisions for you when you can't speak for yourself. This person is called a health care agent. If you do not have an advance directive, decisions about your medical care may be made by a doctor or a  who doesn't know you. It may help to think of an advance directive as a gift to the people who care for you.  If you have one, they won't have to make tough decisions by themselves. Follow-up care is a key part of your treatment and safety. Be sure to make and go to all appointments, and call your doctor if you are having problems. It's also a good idea to know your test results and keep a list of the medicines you take. How can you care for yourself at home? · Discuss your wishes with your loved ones and your doctor. This way, there are no surprises. · Many states have a unique form. Or you might use a universal form that has been approved by many states. This kind of form can sometimes be completed and stored online. Your electronic copy will then be available wherever you have a connection to the Internet. In most cases, doctors will respect your wishes even if you have a form from a different state. · You don't need a  to do an advance directive. But you may want to get legal advice. · Think about these questions when you prepare an advance directive:  ¨ Who do you want to make decisions about your medical care if you are not able to? Many people choose a family member or close friend. ¨ Do you know enough about life support methods that might be used? If not, talk to your doctor so you understand. ¨ What are you most afraid of that might happen? You might be afraid of having pain, losing your independence, or being kept alive by machines. ¨ Where would you prefer to die? Choices include your home, a hospital, or a nursing home. ¨ Would you like to have information about hospice care to support you and your family? ¨ Do you want to donate organs when you die? ¨ Do you want certain Caodaism practices performed before you die? If so, put your wishes in the advance directive. · Read your advance directive every year, and make changes as needed. When should you call for help? Be sure to contact your doctor if you have any questions. Where can you learn more? Go to http://nimco-madan.info/.   Enter R264 in the search box to learn more about \"Advance Directives: Care Instructions. \"  Current as of: November 17, 2016  Content Version: 11.2  © 1102-7631 [employer]. Care instructions adapted under license by [employer] (which disclaims liability or warranty for this information). If you have questions about a medical condition or this instruction, always ask your healthcare professional. [address] any warranty or liability for your use of this information.

## 2017-06-16 ENCOUNTER — OFFICE VISIT (OUTPATIENT)
Dept: CARDIOLOGY CLINIC | Age: 72
End: 2017-06-16

## 2017-06-16 VITALS
WEIGHT: 189 LBS | DIASTOLIC BLOOD PRESSURE: 60 MMHG | HEIGHT: 73 IN | BODY MASS INDEX: 25.05 KG/M2 | SYSTOLIC BLOOD PRESSURE: 142 MMHG | HEART RATE: 65 BPM

## 2017-06-16 DIAGNOSIS — I42.9 CARDIOMYOPATHY, UNSPECIFIED TYPE (HCC): ICD-10-CM

## 2017-06-16 DIAGNOSIS — I10 ESSENTIAL HYPERTENSION, BENIGN: ICD-10-CM

## 2017-06-16 DIAGNOSIS — E78.2 MIXED HYPERLIPIDEMIA: ICD-10-CM

## 2017-06-16 DIAGNOSIS — R55 SYNCOPE, UNSPECIFIED SYNCOPE TYPE: ICD-10-CM

## 2017-06-16 DIAGNOSIS — I50.32 CHRONIC DIASTOLIC CONGESTIVE HEART FAILURE (HCC): Primary | ICD-10-CM

## 2017-06-16 DIAGNOSIS — Z95.0 CARDIAC PACEMAKER: ICD-10-CM

## 2017-06-16 RX ORDER — LOTEPREDNOL ETABONATE 5 MG/G
GEL OPHTHALMIC
COMMUNITY

## 2017-06-16 RX ORDER — LEVETIRACETAM 500 MG/1
TABLET ORAL 2 TIMES DAILY
COMMUNITY

## 2017-06-16 RX ORDER — TIMOLOL MALEATE 5 MG/ML
1 SOLUTION/ DROPS OPHTHALMIC 2 TIMES DAILY
COMMUNITY

## 2017-06-16 NOTE — MR AVS SNAPSHOT
Visit Information Date & Time Provider Department Dept. Phone Encounter #  
 6/16/2017 10:00 AM Diamond Garcia MD [facility] [phone] [id number] Follow-up Instructions Return in about 6 months (around 12/16/2017), or if symptoms worsen or fail to improve, for with pacer/ICD check. Upcoming Health Maintenance Date Due Hepatitis C Screening 1945 HEMOGLOBIN A1C Q6M 1945 FOOT EXAM Q1 7/18/1957 MICROALBUMIN Q1 7/18/1957 EYE EXAM RETINAL OR DILATED Q1 7/18/1957 DTaP/Tdap/Td series (1 - Tdap) 7/18/1968 FOBT Q 1 YEAR AGE 50-75 7/18/1997 ZOSTER VACCINE AGE 60> 7/18/2007 GLAUCOMA SCREENING Q2Y 7/18/2012 Pneumococcal 65+ Low/Medium Risk (1 of 2 - PCV13) 7/18/2012 MEDICARE YEARLY EXAM 7/18/2012 LIPID PANEL Q1 2/23/2016 INFLUENZA AGE 9 TO ADULT 8/1/2017 Allergies as of 6/16/2017  Review Complete On: 6/16/2017 By: Diamond Garcia MD  
  
 Severity Noted Reaction Type Reactions Shellfish Derived    Not Reported This Time Current Immunizations  Never Reviewed No immunizations on file. Not reviewed this visit You Were Diagnosed With   
  
 Codes Comments Chronic diastolic congestive heart failure (HCC)    -  Primary ICD-10-CM: I50.32 
ICD-9-CM: 428.32, 428.0 Stable symptoms, pt w/c bound Mixed hyperlipidemia     ICD-10-CM: E78.2 ICD-9-CM: 272.2 3/17 LDL70, ; 2/15 LDL72; TG 98; 9/14 [id number]; ;  
  
 Essential hypertension, benign     ICD-10-CM: I10 
ICD-9-CM: 401.1 6/17 controlled;  
 Cardiac pacemaker     ICD-10-CM: Z95.0 ICD-9-CM: V45.01 3/17(rare NSVT-longest for 10 secs) nl function 
needs pacer chack asap Syncope, unspecified syncope type     ICD-10-CM: R55 
ICD-9-CM: 780.2 6/17 likely seizure, no recurrence on Keppra; 4/17 no seizure meds;  
  
Vitals BP Pulse Height(growth percentile) Weight(growth percentile) BMI Smoking Status 142/60 65 6' 1\" (1.854 m) 189 lb (85.7 kg) 24.94 kg/m2 Former Smoker Vitals History BMI and BSA Data Body Mass Index Body Surface Area 24.94 kg/m 2 2.1 m 2 Preferred Pharmacy Pharmacy Name Phone [address], [address] [address] [phone] Your Updated Medication List  
  
   
This list is accurate as of: 6/16/17 10:47 AM.  Always use your most recent med list.  
  
  
  
  
 allopurinol 100 mg tablet Commonly known as:  Nyoka Raphael Take  by mouth daily. aspirin 81 mg tablet Take 81 mg by mouth. atorvastatin 10 mg tablet Commonly known as:  LIPITOR Take  by mouth daily. azithromycin 1 % ophthalmic solution Commonly known as:  Azeem Valentino Administer 1 Drop to both eyes two (2) times a day. bisacodyl 10 mg suppository Commonly known as:  DULCOLAX Insert 10 mg into rectum as needed. ferrous sulfate 324 mg (65 mg iron) tablet Take  by mouth Daily (before breakfast). KEPPRA 500 mg tablet Generic drug:  levETIRAcetam  
Take  by mouth two (2) times a day. LEVEMIR 100 unit/mL injection Generic drug:  insulin detemir 48 Units by SubCUTAneous route nightly. lisinopril 2.5 mg tablet Commonly known as:  Ange Gonsales Take 1 Tab by mouth daily. Indications: Chronic Heart Failure LOTEMAX 0.5 % Drpg Generic drug:  loteprednol etabonate Apply  to eye. DAHL MILK OF MAGNESIA 400 mg/5 mL suspension Generic drug:  magnesium hydroxide Take 30 mL by mouth daily as needed for Constipation. polyvinyl alcohol 1.4 % ophthalmic solution Commonly known as:  Diantha  Administer 2 Drops to both eyes three (3) times daily as needed. predniSONE 2.5 mg tablet Commonly known as:  Nanine Cuna Take 2.5 mg by mouth daily (with breakfast). sodium bicarbonate 650 mg tablet Take 650 mg by mouth two (2) times a day. tetrahydrozoline-peg 0.05-1 % Drop Apply  to eye four (4) times daily. timolol 0.5 % ophthalmic solution Commonly known as:  TIMOPTIC  
1 Drop two (2) times a day. TIMOPTIC OCUDOSE (PF) 0.5 % Dpet Generic drug:  timolol Administer 1 Drop to both eyes two (2) times a day. trimethoprim-polymyxin b ophthalmic solution Commonly known as:  POLYTRIM Administer 1 Drop to both eyes three (3) times daily. TYLENOL 325 mg tablet Generic drug:  acetaminophen Take 500 mg by mouth two (2) times a day. VITAMIN D3 1,000 unit Cap Generic drug:  cholecalciferol Take  by mouth. 2 tablets daily XALATAN 0.005 % ophthalmic solution Generic drug:  latanoprost  
Administer 1 Drop to both eyes nightly. ZEMPLAR 1 mcg capsule Generic drug:  paricalcitol Take 1 mcg by mouth daily. ZETIA 10 mg tablet Generic drug:  ezetimibe Take 10 mg by mouth daily. Follow-up Instructions Return in about 6 months (around 12/16/2017), or if symptoms worsen or fail to improve, for with pacer/ICD check. Patient Instructions   
please get remote checks for pacer or ICD every 3 months and Office check in 1 yr Please call our office after every transmission to confirm success of transmission There are no discontinued medications. No orders of the defined types were placed in this encounter. High Cholesterol: Care Instructions Your Care Instructions Cholesterol is a type of fat in your blood. It is needed for many body functions, such as making new cells. Cholesterol is made by your body. It also comes from food you eat. High cholesterol means that you have too much of the fat in your blood. This raises your risk of a heart attack and stroke. LDL and HDL are part of your total cholesterol. LDL is the \"bad\" cholesterol.  High LDL can raise your risk for heart disease, heart attack, and stroke. HDL is the \"good\" cholesterol. It helps clear bad cholesterol from the body. High HDL is linked with a lower risk of heart disease, heart attack, and stroke. Your cholesterol levels help your doctor find out your risk for having a heart attack or stroke. You and your doctor can talk about whether you need to lower your risk and what treatment is best for you. A heart-healthy lifestyle along with medicines can help lower your cholesterol and your risk. The way you choose to lower your risk will depend on how high your risk is for heart attack and stroke. It will also depend on how you feel about taking medicines. Follow-up care is a key part of your treatment and safety. Be sure to make and go to all appointments, and call your doctor if you are having problems. It's also a good idea to know your test results and keep a list of the medicines you take. How can you care for yourself at home? · Eat a variety of foods every day. Good choices include fruits, vegetables, whole grains (like oatmeal), dried beans and peas, nuts and seeds, soy products (like tofu), and fat-free or low-fat dairy products. · Replace butter, margarine, and hydrogenated or partially hydrogenated oils with olive and canola oils. (Canola oil margarine without trans fat is fine.) · Replace red meat with fish, poultry, and soy protein (like tofu). · Limit processed and packaged foods like chips, crackers, and cookies. · Bake, broil, or steam foods. Don't rawls them. · Be physically active. Get at least 30 minutes of exercise on most days of the week. Walking is a good choice. You also may want to do other activities, such as running, swimming, cycling, or playing tennis or team sports. · Stay at a healthy weight or lose weight by making the changes in eating and physical activity listed above. Losing just a small amount of weight, even 5 to 10 pounds, can reduce your risk for having a heart attack or stroke. · Do not smoke. When should you call for help? Watch closely for changes in your health, and be sure to contact your doctor if: 
· You need help making lifestyle changes. · You have questions about your medicine. Where can you learn more? Go to http://nimco-madan.info/. Enter L157 in the search box to learn more about \"High Cholesterol: Care Instructions. \" Current as of: January 27, 2016 Content Version: 11.2 © 1474-0601 [employer]. Care instructions adapted under license by [employer] (which disclaims liability or warranty for this information). If you have questions about a medical condition or this instruction, always ask your healthcare professional. [address] any warranty or liability for your use of this information. Introducing [facility]! Ruma Cleverjack introduces Conjure patient portal. Now you can access parts of your medical record, email your doctor's office, and request medication refills online. 1. In your internet browser, go to [email or web address] 2. Click on the First Time User? Click Here link in the Sign In box. You will see the New Member Sign Up page. 3. Enter your Conjure Access Code exactly as it appears below. You will not need to use this code after youve completed the sign-up process. If you do not sign up before the expiration date, you must request a new code. · Conjure Access Code: [email or web address] Expires: 9/14/2017  9:59 AM 
 
4. Enter the last four digits of your Social Security Number (xxxx) and Date of Birth (mm/dd/yyyy) as indicated and click Submit. You will be taken to the next sign-up page. 5. Create a Snaptracst ID. This will be your Conjure login ID and cannot be changed, so think of one that is secure and easy to remember. 6. Create a Conjure password. You can change your password at any time. 7. Enter your Password Reset Question and Answer.  This can be used at a later time if you forget your password. 8. Enter your e-mail address. You will receive e-mail notification when new information is available in [address] 9. Click Sign Up. You can now view and download portions of your medical record. 10. Click the Download Summary menu link to download a portable copy of your medical information. If you have questions, please visit the Frequently Asked Questions section of the [employer] website. Remember, [employer] is NOT to be used for urgent needs. For medical emergencies, dial 911. Now available from your iPhone and Android! Please provide this summary of care documentation to your next provider. Your primary care clinician is listed as Ata Meraz. If you have any questions after today's visit, please call [phone].

## 2017-06-16 NOTE — PROGRESS NOTES
1. Have you been to the ER, urgent care clinic since your last visit? Hospitalized since your last visit?     no  2. Have you seen or consulted any other health care providers outside of the [address] [place] [address] since your last visit? Include any pap smears or colon screening. No     3. Since your last visit, have you had any of the following symptoms?   no     4. Have you had any blood work, X-rays or cardiac testing? Yes Where: [facility] nursing home     Requested: YES       5. Where do you normally have your labs drawn? Nursing home  6. Do you need any refills today?    no

## 2017-06-16 NOTE — LETTER
Rajinder Lobato 1945 6/16/2017 Dear MD Kirill Gomez MD 
 
I had the pleasure of evaluating  Mr. Sherwin Severance in office today. Below are the relevant portions of my assessment and plan of care. ICD-10-CM ICD-9-CM 1. Chronic diastolic congestive heart failure (HCC) I50.32 428.32   
  428.0 Stable symptoms, pt w/c bound 2. Mixed hyperlipidemia E78.2 272.2   
 3/17 LDL70, ; 2/15 LDL72; TG 98; 9/14 [id number]; ;  
  
3. Essential hypertension, benign I10 401.1 6/17 controlled;  
4. Cardiac pacemaker-DDD Z95.0 V45.01   
 3/17(rare NSVT-longest for 10 secs) nl function 
needs pacer chack asap 5. Syncope, unspecified syncope type R55 780.2 6/17 likely seizure, no recurrence on Keppra; 4/17 no seizure meds;  
 
 
Current Outpatient Prescriptions Medication Sig Dispense Refill  loteprednol etabonate (LOTEMAX) 0.5 % drpg Apply  to eye.  levETIRAcetam (KEPPRA) 500 mg tablet Take  by mouth two (2) times a day.  timolol (TIMOPTIC) 0.5 % ophthalmic solution 1 Drop two (2) times a day.  magnesium hydroxide (DAHL MILK OF MAGNESIA) 400 mg/5 mL suspension Take 30 mL by mouth daily as needed for Constipation.  lisinopril (PRINIVIL, ZESTRIL) 2.5 mg tablet Take 1 Tab by mouth daily. Indications: Chronic Heart Failure 30 Tab 1  
 sodium bicarbonate 650 mg tablet Take 650 mg by mouth two (2) times a day.  bisacodyl (DULCOLAX) 10 mg suppository Insert 10 mg into rectum as needed.  ferrous sulfate 324 mg (65 mg iron) tablet Take  by mouth Daily (before breakfast).  azithromycin (AZASITE) 1 % ophthalmic solution Administer 1 Drop to both eyes two (2) times a day.  atorvastatin (LIPITOR) 10 mg tablet Take  by mouth daily.  trimethoprim-polymyxin b (POLYTRIM) ophthalmic solution Administer 1 Drop to both eyes three (3) times daily.  tetrahydrozoline-peg 0.05-1 % drop Apply  to eye four (4) times daily.  Cholecalciferol, Vitamin D3, (VITAMIN D3) 1,000 unit cap Take  by mouth. 2 tablets daily  insulin detemir (LEVEMIR) 100 unit/mL injection 48 Units by SubCUTAneous route nightly.  allopurinol (ZYLOPRIM) 100 mg tablet Take  by mouth daily.  latanoprost (XALATAN) 0.005 % ophthalmic solution Administer 1 Drop to both eyes nightly.  aspirin 81 mg tablet Take 81 mg by mouth.  timolol (TIMOPTIC OCUDOSE, PF,) 0.5 % Dpet Administer 1 Drop to both eyes two (2) times a day.  paricalcitol (ZEMPLAR) 1 mcg capsule Take 1 mcg by mouth daily.  predniSONE (DELTASONE) 2.5 mg tablet Take 2.5 mg by mouth daily (with breakfast).  ezetimibe (ZETIA) 10 mg tablet Take 10 mg by mouth daily.  acetaminophen (TYLENOL) 325 mg tablet Take 500 mg by mouth two (2) times a day.  polyvinyl alcohol (LIQUIFILM TEARS) 1.4 % ophthalmic solution Administer 2 Drops to both eyes three (3) times daily as needed. Orders Placed This Encounter  loteprednol etabonate (LOTEMAX) 0.5 % drpg Sig: Apply  to eye.  levETIRAcetam (KEPPRA) 500 mg tablet Sig: Take  by mouth two (2) times a day.  timolol (TIMOPTIC) 0.5 % ophthalmic solution Si Drop two (2) times a day. If you have questions, please do not hesitate to call me. I look forward to following Mr. Hajizaida Hemphill along with you. Sincerely, Cora Ayala MD

## 2017-06-16 NOTE — PROGRESS NOTES
HISTORY OF PRESENT ILLNESS  Sera Sandoval is a 73 y.o. male. HPI Comments: F/u of CHF, HTN, HLD, syncope, s/p perm pacemaker    6/17 being treated for seizures, no more syncope  3/17 seen due to syncopal episodes. Not remembered by pt. Lasting about 10 mts. Had good BP and sugar per wife. No associated symptoms. Wife can't remember if pt was stiff or flaccid. No incontinence. No tongue biting. No witnessed shaking  Patient denies significant chest pain, SOB, palpitations, edema, dizziness      CHF   The history is provided by the medical records. This is a chronic problem. Pertinent negatives include no chest pain, no abdominal pain, no headaches and no shortness of breath. Hypertension   The history is provided by the medical records. This is a chronic problem. Pertinent negatives include no chest pain, no abdominal pain, no headaches and no shortness of breath. Cholesterol Problem   The history is provided by the medical records. This is a chronic problem. Pertinent negatives include no chest pain, no abdominal pain, no headaches and no shortness of breath. Review of Systems   Constitutional: Negative for chills, diaphoresis, fever, malaise/fatigue and weight loss. HENT: Negative for ear pain and hearing loss. Eyes: Negative for blurred vision. Respiratory: Negative for cough, hemoptysis, sputum production, shortness of breath, wheezing and stridor. Cardiovascular: Negative for chest pain, palpitations, orthopnea, claudication, leg swelling and PND. Gastrointestinal: Negative for abdominal pain, heartburn, nausea and vomiting. Musculoskeletal: Negative for myalgias and neck pain. Skin: Negative for rash. Neurological: Negative for dizziness, tingling, tremors, focal weakness, loss of consciousness, weakness and headaches. Not responding appropriately   Psychiatric/Behavioral: Negative for depression and suicidal ideas.      Allergies   Allergen Reactions    Shellfish Derived Not Reported This Time       Past Medical History:   Diagnosis Date    Atrioventricular block, complete ([facility]     Cardiac pacemaker     Cardiomyopathy ([facility] 4/21/2017    3/17 EF 40%, pt w/c bound and not a candidate for any invasive w/u. Will avoid stress test also as no CP    Cerebrovascular disease     1995, 2003-residual left hemiparesis    Chronic diastolic heart failure (HCC)     Congestive heart failure ([address]     DNR (do not resuscitate) discussion 4/21/2017 4/17 with pt and sister   Barry Alegria hypertension     Essential hypertension, benign     Hypertension     Myocardial infarct [facility]) 1993, 2004    Other and unspecified hyperlipidemia 7/11    low density lipoproteins (LDLs) are at goal, triglycerides are at goal, high density lipoproteins (HDLs) are at goal.    Renal insufficiency     Shortness of breath     Stroke ([facility]     1995, 2003-residual left hemiparesis    Syncope 3/10/2017    3/17 unlikely cardiac as BP good during/just after episode and no arrhythmias noted on pacer check today    Type II or unspecified type diabetes mellitus without mention of complication, not stated as uncontrolled        Family History   Problem Relation Age of Onset    Stroke Mother 39     cerebrovascular disease; cause of death was a cerebrovascular accident    Stroke Father 61    Heart Attack Neg Hx        Social History   Substance Use Topics    Smoking status: Former Smoker     Quit date: 7/14/2009    Smokeless tobacco: Never Used    Alcohol use No        Current Outpatient Prescriptions   Medication Sig    magnesium hydroxide (DAHL MILK OF MAGNESIA) 400 mg/5 mL suspension Take 30 mL by mouth daily as needed for Constipation.  lisinopril (PRINIVIL, ZESTRIL) 2.5 mg tablet Take 1 Tab by mouth daily. Indications: Chronic Heart Failure    sodium bicarbonate 650 mg tablet Take 650 mg by mouth two (2) times a day.  bisacodyl (DULCOLAX) 10 mg suppository Insert 10 mg into rectum as needed.  ferrous sulfate 324 mg (65 mg iron) tablet Take  by mouth Daily (before breakfast).  azithromycin (AZASITE) 1 % ophthalmic solution Administer 1 Drop to both eyes two (2) times a day.  atorvastatin (LIPITOR) 10 mg tablet Take  by mouth daily.  polyvinyl alcohol (LIQUIFILM TEARS) 1.4 % ophthalmic solution Administer 2 Drops to both eyes three (3) times daily as needed.  trimethoprim-polymyxin b (POLYTRIM) ophthalmic solution Administer 1 Drop to both eyes three (3) times daily.  tetrahydrozoline-peg 0.05-1 % drop Apply  to eye four (4) times daily.  Cholecalciferol, Vitamin D3, (VITAMIN D3) 1,000 unit cap Take  by mouth. 2 tablets daily    insulin detemir (LEVEMIR) 100 unit/mL injection 48 Units by SubCUTAneous route nightly.  allopurinol (ZYLOPRIM) 100 mg tablet Take  by mouth daily.  latanoprost (XALATAN) 0.005 % ophthalmic solution Administer 1 Drop to both eyes nightly.  aspirin 81 mg tablet Take 81 mg by mouth.  timolol (TIMOPTIC OCUDOSE, PF,) 0.5 % Dpet Administer 1 Drop to both eyes two (2) times a day.  paricalcitol (ZEMPLAR) 1 mcg capsule Take 1 mcg by mouth daily.  predniSONE (DELTASONE) 2.5 mg tablet Take 2.5 mg by mouth daily (with breakfast).  ezetimibe (ZETIA) 10 mg tablet Take 10 mg by mouth daily.  acetaminophen (TYLENOL) 325 mg tablet Take 500 mg by mouth two (2) times a day. No current facility-administered medications for this visit. Past Surgical History:   Procedure Laterality Date    CARDIAC SURG PROCEDURE UNLIST      cardiac pacemaker; please get remote checks for pacer or ICD every 3 months and office check in 1 year    HX PACEMAKER  2003; gen change 4/14       Diagnostic Studies: Old records reviewed and show as follows  EKG tracings reviewed by me today.     CARDIOLOGY STUDIES 2/20/2014 4/21/2007   Myocardial Perfusion Scan Result - nl scan, mild partial reversible defect of inferior wall, EF 77%   Echocardiogram - Complete Result 60%EF, sev LVH, mild dil LA/RA/RV 4/07 EF 55%, mod DD; 4/07 EF 50%   3/17 ECHO  SUMMARY:  Procedure information: Echocardiographic views were limited by restricted  patient mobility. Patient was scanned in a wheelchair. Left ventricle: Systolic function was moderately reduced. Ejection  fraction was estimated in the range of 35 % to 40 %. There was diffuse  hypokinesis. Wall thickness was moderately to markedly increased. Doppler  parameters were consistent with abnormal left ventricular relaxation  (grade 1 diastolic dysfunction). Right ventricle: The ventricle was mildly to moderately dilated. Mitral valve: There was mild annular calcification. Tricuspid valve: There was mild regurgitation. Pulmonic valve: There was mild regurgitation. COMPARISONS:  Comparison was made with the previous study of 20-Feb-2014. LV overall  function has decreased from 60 % to 40 %. Visit Vitals    /60    Pulse 65    Ht 6' 1\" (1.854 m)    Wt 85.7 kg (189 lb)    BMI 24.94 kg/m2       Mr. Mirian Beasley has a reminder for a \"due or due soon\" health maintenance. I have asked that he contact his primary care provider for follow-up on this health maintenance. Physical Exam   Constitutional: He is oriented to person, place, and time. He appears well-developed and well-nourished. No distress. Wheel chair bound   HENT:   Head: Normocephalic and atraumatic. Eyes: Right eye exhibits no discharge. Left eye exhibits no discharge. No scleral icterus. Neck: Neck supple. No JVD present. Carotid bruit is not present. No thyromegaly present. Cardiovascular: Normal rate, regular rhythm, S1 normal, S2 normal, normal heart sounds and intact distal pulses. Exam reveals no gallop and no friction rub. No murmur heard. Pulmonary/Chest: Effort normal. He has no wheezes. He has no rales. Abdominal: Soft. He exhibits no mass. There is no tenderness. Musculoskeletal: He exhibits no edema. Neurological: He is alert and oriented to person, place, and time. Left suzanne   Skin: Skin is warm and dry. No rash noted. Psychiatric: He has a normal mood and affect. His behavior is normal.       ASSESSMENT and German Monson was seen today for cardiomyopathy. Diagnoses and all orders for this visit:    Chronic diastolic congestive heart failure ([facility] [address]  Comments:  Stable symptoms, pt w/c bound      Mixed hyperlipidemia  Comments:  3/17 LDL70, ; 2/15 LDL72; TG 98; 9/14 [id number]; ;       Essential hypertension, benign  Comments:  6/17 controlled;    Cardiac pacemaker-DDD  Comments:  3/17(rare NSVT-longest for 10 secs) nl function  needs pacer chack asap  Orders:  -     PROGRAM EVAL (IN PERSON) IMPLANT DEVICE,PACEMAKER,MULT LEAD    Syncope, unspecified syncope type  Comments:  6/17 likely seizure, no recurrence on Keppra; 4/17 no seizure meds;        Pertinent laboratory and test data reviewed and discussed with patient. See patient instructions also for other medical advice given    There are no discontinued medications. Follow-up Disposition:  Return in about 6 months (around 12/16/2017), or if symptoms worsen or fail to improve, for with pacer/ICD check.

## 2017-06-16 NOTE — PATIENT INSTRUCTIONS
please get remote checks for pacer or ICD every 3 months and Office check in 1 yr  Please call our office after every transmission to confirm success of transmission  There are no discontinued medications. Orders Placed This Encounter    PROGRAM EVAL (IN PERSON) IMPLANT DEVICE,PACEMAKER,MULT LEAD     Order Specific Question:   Reason for Exam:     Answer:   f/u pacer          High Cholesterol: Care Instructions  Your Care Instructions  Cholesterol is a type of fat in your blood. It is needed for many body functions, such as making new cells. Cholesterol is made by your body. It also comes from food you eat. High cholesterol means that you have too much of the fat in your blood. This raises your risk of a heart attack and stroke. LDL and HDL are part of your total cholesterol. LDL is the \"bad\" cholesterol. High LDL can raise your risk for heart disease, heart attack, and stroke. HDL is the \"good\" cholesterol. It helps clear bad cholesterol from the body. High HDL is linked with a lower risk of heart disease, heart attack, and stroke. Your cholesterol levels help your doctor find out your risk for having a heart attack or stroke. You and your doctor can talk about whether you need to lower your risk and what treatment is best for you. A heart-healthy lifestyle along with medicines can help lower your cholesterol and your risk. The way you choose to lower your risk will depend on how high your risk is for heart attack and stroke. It will also depend on how you feel about taking medicines. Follow-up care is a key part of your treatment and safety. Be sure to make and go to all appointments, and call your doctor if you are having problems. It's also a good idea to know your test results and keep a list of the medicines you take. How can you care for yourself at home? · Eat a variety of foods every day.  Good choices include fruits, vegetables, whole grains (like oatmeal), dried beans and peas, nuts and seeds, soy products (like tofu), and fat-free or low-fat dairy products. · Replace butter, margarine, and hydrogenated or partially hydrogenated oils with olive and canola oils. (Canola oil margarine without trans fat is fine.)  · Replace red meat with fish, poultry, and soy protein (like tofu). · Limit processed and packaged foods like chips, crackers, and cookies. · Bake, broil, or steam foods. Don't rawls them. · Be physically active. Get at least 30 minutes of exercise on most days of the week. Walking is a good choice. You also may want to do other activities, such as running, swimming, cycling, or playing tennis or team sports. · Stay at a healthy weight or lose weight by making the changes in eating and physical activity listed above. Losing just a small amount of weight, even 5 to 10 pounds, can reduce your risk for having a heart attack or stroke. · Do not smoke. When should you call for help? Watch closely for changes in your health, and be sure to contact your doctor if:  · You need help making lifestyle changes. · You have questions about your medicine. Where can you learn more? Go to http://nimco-madan.info/. Enter J463 in the search box to learn more about \"High Cholesterol: Care Instructions. \"  Current as of: January 27, 2016  Content Version: 11.2  © 5838-6241 [employer]. Care instructions adapted under license by [employer] (which disclaims liability or warranty for this information). If you have questions about a medical condition or this instruction, always ask your healthcare professional. [place] [address] any warranty or liability for your use of this information.

## 2017-12-15 NOTE — PROGRESS NOTES
1. Have you been to the ER, urgent care clinic since your last visit? Hospitalized since your last visit? No    2. Have you seen or consulted any other health care providers outside of the [address] [place] [address] since your last visit? Include any pap smears or colon screening. No     3. Since your last visit, have you had any of the following symptoms? .         4. Have you had any blood work, X-rays or cardiac testing? Yes Where: Nursing home Reason for visit: Labs            5.  Where do you normally have your labs drawn? Nursing Home    6. Do you need any refills today?    No

## 2017-12-15 NOTE — LETTER
Sussy Janina 1945 
 
12/15/2017 Dear MD Veronica Martell MD 
 
I had the pleasure of evaluating  Mr. Jaxson Marino in office today. Below are the relevant portions of my assessment and plan of care. ICD-10-CM ICD-9-CM 1. Cardiomyopathy, unspecified type ([address] I42.9 425.4   
 12/17 possibly nonischemic, add low dose coreg 2. Essential hypertension, benign V74 202.8 METABOLIC PANEL, BASIC  
 83/35; 6/17 controlled; 3. Atrioventricular block, complete (HCC) I44.2 426.0 Present when pacer done in 2003 4. Cardiac pacemaker-DDD Z95.0 V45.01 PROGRAM EVAL (IN PERSON) IMPLANT DEVICE,PACEMAKER,MULT LEAD  
  12/17 nl function, upto 2 sec NSVT-rare 5. DNR (do not resuscitate) discussion Z71.89 V65.49 Current Outpatient Prescriptions Medication Sig Dispense Refill  carvedilol (COREG) 3.125 mg tablet Take 1 Tab by mouth two (2) times daily (with meals). Hold if SBP<100 60 Tab 3  
 loteprednol etabonate (LOTEMAX) 0.5 % drpg Apply  to eye.  levETIRAcetam (KEPPRA) 500 mg tablet Take  by mouth two (2) times a day.  timolol (TIMOPTIC) 0.5 % ophthalmic solution 1 Drop two (2) times a day.  magnesium hydroxide (DAHL MILK OF MAGNESIA) 400 mg/5 mL suspension Take 30 mL by mouth daily as needed for Constipation.  lisinopril (PRINIVIL, ZESTRIL) 2.5 mg tablet Take 1 Tab by mouth daily. Indications: Chronic Heart Failure 30 Tab 1  
 sodium bicarbonate 650 mg tablet Take 650 mg by mouth two (2) times a day.  bisacodyl (DULCOLAX) 10 mg suppository Insert 10 mg into rectum as needed.  ferrous sulfate 324 mg (65 mg iron) tablet Take  by mouth Daily (before breakfast).  azithromycin (AZASITE) 1 % ophthalmic solution Administer 1 Drop to both eyes two (2) times a day.  atorvastatin (LIPITOR) 10 mg tablet Take  by mouth daily.     
 polyvinyl alcohol (LIQUIFILM TEARS) 1.4 % ophthalmic solution Administer 2 Drops to both eyes three (3) times daily as needed.  trimethoprim-polymyxin b (POLYTRIM) ophthalmic solution Administer 1 Drop to both eyes three (3) times daily.  tetrahydrozoline-peg 0.05-1 % drop Apply  to eye four (4) times daily.  Cholecalciferol, Vitamin D3, (VITAMIN D3) 1,000 unit cap Take  by mouth. 2 tablets daily  insulin detemir (LEVEMIR) 100 unit/mL injection 48 Units by SubCUTAneous route nightly.  allopurinol (ZYLOPRIM) 100 mg tablet Take  by mouth daily.  latanoprost (XALATAN) 0.005 % ophthalmic solution Administer 1 Drop to both eyes nightly.  aspirin 81 mg tablet Take 81 mg by mouth.  timolol (TIMOPTIC OCUDOSE, PF,) 0.5 % Dpet Administer 1 Drop to both eyes two (2) times a day.  paricalcitol (ZEMPLAR) 1 mcg capsule Take 1 mcg by mouth daily.  predniSONE (DELTASONE) 2.5 mg tablet Take 2.5 mg by mouth daily (with breakfast).  ezetimibe (ZETIA) 10 mg tablet Take 10 mg by mouth daily.  acetaminophen (TYLENOL) 325 mg tablet Take 500 mg by mouth two (2) times a day. Orders Placed This Encounter  METABOLIC PANEL, BASIC Standing Status:   Future Standing Expiration Date:   3/17/2018  PROGRAM EVAL (IN PERSON) IMPLANT DEVICE,PACEMAKER,MULT LEAD Order Specific Question:   Reason for Exam: Answer:   f/u pacer  carvedilol (COREG) 3.125 mg tablet Sig: Take 1 Tab by mouth two (2) times daily (with meals). Hold if SBP<100 Dispense:  60 Tab Refill:  3 If you have questions, please do not hesitate to call me. I look forward to following Mr. Jeraline Halsted along with you. Sincerely, Zeinab Ward MD

## 2017-12-15 NOTE — PROGRESS NOTES
HISTORY OF PRESENT ILLNESS  Lois Fabian is a 81 y.o. male. HPI Comments: F/u of CHF, HTN, HLD, syncope, s/p perm pacemaker    Patient denies significant chest pain, SOB, palpitations, edema, dizziness    6/17 being treated for seizures, no more syncope  3/17 seen due to syncopal episodes. Not remembered by pt. Lasting about 10 mts. Had good BP and sugar per wife. No associated symptoms. Wife can't remember if pt was stiff or flaccid. No incontinence. No tongue biting. No witnessed shaking  Patient denies significant chest pain, SOB, palpitations, edema, dizziness      Cholesterol Problem   The history is provided by the medical records. This is a chronic problem. Pertinent negatives include no chest pain, no abdominal pain, no headaches and no shortness of breath. CHF   The history is provided by the medical records. This is a chronic problem. Pertinent negatives include no chest pain, no abdominal pain, no headaches and no shortness of breath. Hypertension   The history is provided by the medical records. This is a chronic problem. Pertinent negatives include no chest pain, no abdominal pain, no headaches and no shortness of breath. Review of Systems   Constitutional: Negative for chills, diaphoresis, fever, malaise/fatigue and weight loss. HENT: Negative for ear pain and hearing loss. Eyes: Negative for blurred vision. Respiratory: Negative for cough, hemoptysis, sputum production, shortness of breath, wheezing and stridor. Cardiovascular: Negative for chest pain, palpitations, orthopnea, claudication, leg swelling and PND. Gastrointestinal: Negative for abdominal pain, heartburn, nausea and vomiting. Musculoskeletal: Negative for myalgias and neck pain. Skin: Negative for rash. Neurological: Negative for dizziness, tingling, tremors, focal weakness, loss of consciousness, weakness and headaches.         Not responding appropriately   Psychiatric/Behavioral: Negative for depression and suicidal ideas. Allergies   Allergen Reactions    Shellfish Derived Not Reported This Time       Past Medical History:   Diagnosis Date    Atrioventricular block, complete ([address]     Cardiac pacemaker     Cardiomyopathy ([address] 4/21/2017    3/17 EF 40%, pt w/c bound and not a candidate for any invasive w/u. Will avoid stress test also as no CP    Cerebrovascular disease     1995, 2003-residual left hemiparesis    Chronic diastolic heart failure (HCC)     Congestive heart failure ([address]     DNR (do not resuscitate) discussion 4/21/2017 4/17 with pt and sister   Hermelinda Dupont hypertension     Essential hypertension, benign     Hypertension     Myocardial infarct 1993, 2004    Other and unspecified hyperlipidemia 7/11    low density lipoproteins (LDLs) are at goal, triglycerides are at goal, high density lipoproteins (HDLs) are at goal.    Renal insufficiency     Shortness of breath     [facility]     1995, 2003-residual left hemiparesis    Syncope 3/10/2017    3/17 unlikely cardiac as BP good during/just after episode and no arrhythmias noted on pacer check today    Type II or unspecified type diabetes mellitus without mention of complication, not stated as uncontrolled        Family History   Problem Relation Age of Onset    Stroke Mother 39     cerebrovascular disease; cause of death was a cerebrovascular accident    Stroke Father 61    Heart Attack Neg Hx        Social History   Substance Use Topics    Smoking status: Former Smoker     Quit date: 7/14/2009    Smokeless tobacco: Never Used    Alcohol use No        Current Outpatient Prescriptions   Medication Sig    loteprednol etabonate (LOTEMAX) 0.5 % drpg Apply  to eye.  levETIRAcetam (KEPPRA) 500 mg tablet Take  by mouth two (2) times a day.  timolol (TIMOPTIC) 0.5 % ophthalmic solution 1 Drop two (2) times a day.     magnesium hydroxide ([employer] MILK OF MAGNESIA) 400 mg/5 mL suspension Take 30 mL by mouth daily as needed for Constipation.  lisinopril (PRINIVIL, ZESTRIL) 2.5 mg tablet Take 1 Tab by mouth daily. Indications: Chronic Heart Failure    sodium bicarbonate 650 mg tablet Take 650 mg by mouth two (2) times a day.  bisacodyl (DULCOLAX) 10 mg suppository Insert 10 mg into rectum as needed.  ferrous sulfate 324 mg (65 mg iron) tablet Take  by mouth Daily (before breakfast).  azithromycin (AZASITE) 1 % ophthalmic solution Administer 1 Drop to both eyes two (2) times a day.  atorvastatin (LIPITOR) 10 mg tablet Take  by mouth daily.  polyvinyl alcohol (LIQUIFILM TEARS) 1.4 % ophthalmic solution Administer 2 Drops to both eyes three (3) times daily as needed.  trimethoprim-polymyxin b (POLYTRIM) ophthalmic solution Administer 1 Drop to both eyes three (3) times daily.  tetrahydrozoline-peg 0.05-1 % drop Apply  to eye four (4) times daily.  Cholecalciferol, Vitamin D3, (VITAMIN D3) 1,000 unit cap Take  by mouth. 2 tablets daily    insulin detemir (LEVEMIR) 100 unit/mL injection 48 Units by SubCUTAneous route nightly.  allopurinol (ZYLOPRIM) 100 mg tablet Take  by mouth daily.  latanoprost (XALATAN) 0.005 % ophthalmic solution Administer 1 Drop to both eyes nightly.  aspirin 81 mg tablet Take 81 mg by mouth.  timolol (TIMOPTIC OCUDOSE, PF,) 0.5 % Dpet Administer 1 Drop to both eyes two (2) times a day.  paricalcitol (ZEMPLAR) 1 mcg capsule Take 1 mcg by mouth daily.  predniSONE (DELTASONE) 2.5 mg tablet Take 2.5 mg by mouth daily (with breakfast).  ezetimibe (ZETIA) 10 mg tablet Take 10 mg by mouth daily.  acetaminophen (TYLENOL) 325 mg tablet Take 500 mg by mouth two (2) times a day. No current facility-administered medications for this visit.          Past Surgical History:   Procedure Laterality Date    CARDIAC SURG PROCEDURE UNLIST      cardiac pacemaker; please get remote checks for pacer or ICD every 3 months and office check in 1 year    HX PACEMAKER 2003; gen change 4/14       Diagnostic Studies: Old records reviewed and show as follows  EKG tracings reviewed by me today. CARDIOLOGY STUDIES 2/20/2014 4/21/2007   Myocardial Perfusion Scan Result - nl scan, mild partial reversible defect of inferior wall, EF 77%   Echocardiogram - Complete Result 60%EF, sev LVH, mild dil LA/RA/RV 4/07 EF 55%, mod DD; 4/07 EF 50%   Some recent data might be hidden   3/17 ECHO  SUMMARY:  Procedure information: Echocardiographic views were limited by restricted  patient mobility. Patient was scanned in a wheelchair. Left ventricle: Systolic function was moderately reduced. Ejection  fraction was estimated in the range of 35 % to 40 %. There was diffuse  hypokinesis. Wall thickness was moderately to markedly increased. Doppler  parameters were consistent with abnormal left ventricular relaxation  (grade 1 diastolic dysfunction). Right ventricle: The ventricle was mildly to moderately dilated. Mitral valve: There was mild annular calcification. Tricuspid valve: There was mild regurgitation. Pulmonic valve: There was mild regurgitation. COMPARISONS:  Comparison was made with the previous study of 20-Feb-2014. LV overall  function has decreased from 60 % to 40 %. Visit Vitals    /48    Pulse 70    Ht 6' 1\" (1.854 m)       Mr. Junior Morris has a reminder for a \"due or due soon\" health maintenance. I have asked that he contact his primary care provider for follow-up on this health maintenance. Physical Exam   Constitutional: He is oriented to person, place, and time. He appears well-developed and well-nourished. No distress. Wheel chair bound   HENT:   Head: Normocephalic and atraumatic. Eyes: Right eye exhibits no discharge. Left eye exhibits no discharge. No scleral icterus. Neck: Neck supple. No JVD present. Carotid bruit is not present. No thyromegaly present.    Cardiovascular: Normal rate, regular rhythm, S1 normal, S2 normal, normal heart sounds and intact distal pulses. Exam reveals no gallop and no friction rub. No murmur heard. Pulmonary/Chest: Effort normal. He has no wheezes. He has no rales. Abdominal: Soft. He exhibits no mass. There is no tenderness. Musculoskeletal: He exhibits no edema. Neurological: He is alert and oriented to person, place, and time. Left suzanne   Skin: Skin is warm and dry. No rash noted. Psychiatric: He has a normal mood and affect. His behavior is normal.       ASSESSMENT and PLAN            Diagnoses and all orders for this visit:    1. Cardiomyopathy, unspecified type ([address]  Comments:  12/17 possibly nonischemic, add low dose coreg    2. Essential hypertension, benign  Comments:  12/17; 6/17 controlled;   Orders:  -     METABOLIC PANEL, BASIC; Future    3. Atrioventricular block, complete (HCC)  Comments:  Present when pacer done in 2003      4. Cardiac pacemaker-DDD  Comments:   12/17 nl function, upto 2 sec NSVT-rare  Orders:  -     PROGRAM EVAL (IN PERSON) IMPLANT DEVICE,PACEMAKER,MULT LEAD    5. DNR (do not resuscitate) discussion    Other orders  -     carvedilol (COREG) 3.125 mg tablet; Take 1 Tab by mouth two (2) times daily (with meals). Hold if SBP<100        Pertinent laboratory and test data reviewed and discussed with patient. See patient instructions also for other medical advice given    There are no discontinued medications. Follow-up Disposition:  Return in about 6 months (around 6/15/2018), or if symptoms worsen or fail to improve, for post test, with pacer/ICD check.

## 2017-12-15 NOTE — MR AVS SNAPSHOT
Visit Information Date & Time Provider Department Dept. Phone Encounter #  
 12/15/2017 11:15 AM Thad Wang MD [facility] [phone] [id number] Follow-up Instructions Return in about 6 months (around 6/15/2018), or if symptoms worsen or fail to improve, for post test, with pacer/ICD check. Your Appointments 6/29/2018 10:30 AM  
PROCEDURE with Thad Wang MD  
[facility] (Arnaldo Hernandez) Appt Note: 6 month follow up/ICD check per Dr Jose C Victor [address] [facility] [address]  
  
   
 [address] [id number] [place] [address] Upcoming Health Maintenance Date Due Hepatitis C Screening 1945 HEMOGLOBIN A1C Q6M 1945 FOOT EXAM Q1 7/18/1957 MICROALBUMIN Q1 7/18/1957 EYE EXAM RETINAL OR DILATED Q1 7/18/1957 DTaP/Tdap/Td series (1 - Tdap) 7/18/1968 FOBT Q 1 YEAR AGE 50-75 7/18/1997 ZOSTER VACCINE AGE 60> 5/18/2007 GLAUCOMA SCREENING Q2Y 7/18/2012 Pneumococcal 65+ Low/Medium Risk (1 of 2 - PCV13) 7/18/2012 MEDICARE YEARLY EXAM 7/18/2012 Influenza Age 5 to Adult 8/1/2017 LIPID PANEL Q1 3/2/2018 Allergies as of 12/15/2017  Review Complete On: 12/15/2017 By: Thad Wang MD  
  
 Severity Noted Reaction Type Reactions Shellfish Derived    Not Reported This Time Current Immunizations  Never Reviewed No immunizations on file. Not reviewed this visit You Were Diagnosed With   
  
 Codes Comments Cardiomyopathy, unspecified type [facility]    -  Primary ICD-10-CM: I42.9 ICD-9-CM: 425.4 12/17 possibly nonischemic, add low dose coreg Essential hypertension, benign     ICD-10-CM: I10 
ICD-9-CM: 401.1 12/17; 6/17 controlled; Atrioventricular block, complete (HCC)     ICD-10-CM: I44.2 ICD-9-CM: 426.0 Present when pacer done in 2003 Cardiac pacemaker     ICD-10-CM: Z95.0 ICD-9-CM: V45.01  12/17 nl function, upto 2 sec NSVT-rare DNR (do not resuscitate) discussion     ICD-10-CM: Z71.89 ICD-9-CM: V65.49 Vitals BP Pulse Height(growth percentile) Smoking Status 108/48 70 6' 1\" (1.854 m) Former Smoker Your Updated Medication List  
  
   
This list is accurate as of: 12/15/17 11:51 AM.  Always use your most recent med list.  
  
  
  
  
 allopurinol 100 mg tablet Commonly known as:  Yanci Bibber Take  by mouth daily. aspirin 81 mg tablet Take 81 mg by mouth. atorvastatin 10 mg tablet Commonly known as:  LIPITOR Take  by mouth daily. azithromycin 1 % ophthalmic solution Commonly known as:  Nitza Bowen Administer 1 Drop to both eyes two (2) times a day. bisacodyl 10 mg suppository Commonly known as:  DULCOLAX Insert 10 mg into rectum as needed. carvedilol 3.125 mg tablet Commonly known as:  Faith Locket Take 1 Tab by mouth two (2) times daily (with meals). Hold if SBP<100  
  
 ferrous sulfate 324 mg (65 mg iron) tablet Take  by mouth Daily (before breakfast). KEPPRA 500 mg tablet Generic drug:  levETIRAcetam  
Take  by mouth two (2) times a day. LEVEMIR 100 unit/mL injection Generic drug:  insulin detemir 48 Units by SubCUTAneous route nightly. lisinopril 2.5 mg tablet Commonly known as:  Danuta Vance Take 1 Tab by mouth daily. Indications: Chronic Heart Failure LOTEMAX 0.5 % Drpg Generic drug:  loteprednol etabonate Apply  to eye. [facility] MILK OF MAGNESIA 400 mg/5 mL suspension Generic drug:  magnesium hydroxide Take 30 mL by mouth daily as needed for Constipation. polyvinyl alcohol 1.4 % ophthalmic solution Commonly known as:  Babar Molina Administer 2 Drops to both eyes three (3) times daily as needed. predniSONE 2.5 mg tablet Commonly known as:  Lucy Bass Take 2.5 mg by mouth daily (with breakfast). sodium bicarbonate 650 mg tablet Take 650 mg by mouth two (2) times a day. tetrahydrozoline-peg 0.05-1 % Drop Apply  to eye four (4) times daily. timolol 0.5 % ophthalmic solution Commonly known as:  TIMOPTIC  
1 Drop two (2) times a day. TIMOPTIC OCUDOSE (PF) 0.5 % Dpet Generic drug:  timolol Administer 1 Drop to both eyes two (2) times a day. trimethoprim-polymyxin b ophthalmic solution Commonly known as:  POLYTRIM Administer 1 Drop to both eyes three (3) times daily. TYLENOL 325 mg tablet Generic drug:  acetaminophen Take 500 mg by mouth two (2) times a day. VITAMIN D3 1,000 unit Cap Generic drug:  cholecalciferol Take  by mouth. 2 tablets daily XALATAN 0.005 % ophthalmic solution Generic drug:  latanoprost  
Administer 1 Drop to both eyes nightly. ZEMPLAR 1 mcg capsule Generic drug:  paricalcitol Take 1 mcg by mouth daily. ZETIA 10 mg tablet Generic drug:  ezetimibe Take 10 mg by mouth daily. Prescriptions Printed Refills  
 carvedilol (COREG) 3.125 mg tablet 3 Sig: Take 1 Tab by mouth two (2) times daily (with meals). Hold if SBP<100 Class: Print Route: Oral  
  
We Performed the Following PROGRAM EVAL (IN PERSON) IMPLANT DEVICE,PACEMAKER,MULT LEAD [id number] CPT(R)] Follow-up Instructions Return in about 6 months (around 6/15/2018), or if symptoms worsen or fail to improve, for post test, with pacer/ICD check. To-Do List   
 Around 01/09/2018 Lab:  METABOLIC PANEL, BASIC Patient Instructions There are no discontinued medications. Orders Placed This Encounter  METABOLIC PANEL, BASIC Standing Status:   Future Standing Expiration Date:   3/17/2018  PROGRAM EVAL (IN PERSON) IMPLANT DEVICE,PACEMAKER,MULT LEAD Order Specific Question:   Reason for Exam: Answer:   f/u pacer  carvedilol (COREG) 3.125 mg tablet Sig: Take 1 Tab by mouth two (2) times daily (with meals). Hold if SBP<100 Dispense:  60 Tab Refill:  3 Managing Other Conditions When You Have Heart Failure: Care Instructions Your Care Instructions All the systems in your body rely on each other to work properly. Heart failure has effects all through your body that can lead to other problems, such as kidney disease. The reverse is also true. A condition like diabetes or lung disease can damage or stress your heart and cause heart failure. Managing any other problems can help reduce your heart's workload and make your heart failure better. Conditions that commonly cause or occur along with heart failure include high blood pressure, diabetes, COPD, high cholesterol, kidney problems, anemia, and arthritis. Follow-up care is a key part of your treatment and safety. Be sure to make and go to all appointments, and call your doctor if you are having problems. It's also a good idea to know your test results and keep a list of the medicines you take. How can you care for yourself at home? Steps to help with heart failure and other problems · Eat less salt (sodium). This helps keep fluid from building up. It may help you feel better. Limiting sodium can also help if you have high blood pressure or kidney disease. · Watch your fluid intake if your doctor tells you to. Reducing fluids can ease your heart's workload and keep your sodium level in balance. · Get regular exercise. Regular, moderate exercise, such as walking, helps your heart. It can also help lower your blood pressure, lower stress, and help you lose weight. · Lose weight if you are overweight. Losing weight can help you manage diabetes, lower your blood pressure and cholesterol level, and reduce the workload on your heart. · Stop smoking. Smoking stresses your lungs, interferes with healing, and can make heart failure worse. · Limit alcohol. Alcohol can raise your blood pressure. Ask your doctor how much, if any, is safe.  
If your doctor has not set you up with a cardiac rehabilitation (rehab) program, talk to him or her about whether that is right for you. Cardiac rehab includes exercise, help with diet and lifestyle changes, and emotional support. To stay as healthy as possible · Work closely with your doctor. Have all your tests, and go to all your appointments. · Take your medicines exactly as prescribed. You will take medicines to treat the other conditions you have along with heart failure. It can be hard to balance the treatment for all your conditions. You will need to have follow-up tests to make sure that all your medicines are working well together. Talk to your doctor if you have any problems with your medicine. · Keep all your doctors informed about your health problems and all the medicines you take for them. Medicines that can treat one condition may make another condition worse. · Talk to your doctor before you take any vitamins, over-the-counter drugs, or herbal products. Do not take aspirin, ibuprofen (Advil, Motrin), or naproxen (Aleve) unless you talk to your doctor first. They could make your heart failure and other problems worse. When should you call for help? Call 911 if you have symptoms of sudden heart failure, such as: 
? · You have severe trouble breathing. ? · You cough up pink, foamy mucus. ? · You have a new irregular or rapid heartbeat. ?Call 911 if you have symptoms of a heart attack. These may include: 
? · Chest pain or pressure, or a strange feeling in the chest.  
? · Sweating. ? · Shortness of breath. ? · Nausea or vomiting. ? · Pain, pressure, or a strange feeling in the back, neck, jaw, or upper belly or in one or both shoulders or arms. ? · Lightheadedness or sudden weakness. ? · A fast or irregular heartbeat. ? After you call 911, the  may tell you to chew 1 adult-strength or 2 to 4 low-dose aspirin. Wait for an ambulance. Do not try to drive yourself. ?Call your doctor now or seek immediate medical care if: ? · You have new or increased shortness of breath. ? · You are dizzy or lightheaded, or you feel like you may faint. ? · You have sudden weight gain, such as more than 2 to 3 pounds in a day or 5 pounds in a week. (Your doctor may suggest a different range of weight gain.) ? · You have increased swelling in your legs, ankles, or feet. ? · You are suddenly so tired or weak that you cannot do your usual activities. ? Watch closely for changes in your health, and be sure to contact your doctor if you develop new symptoms. Where can you learn more? Go to http://nimco-madan.info/. Enter P052 in the search box to learn more about \"Managing Other Conditions When You Have Heart Failure: Care Instructions. \" Current as of: September 21, 2016 Content Version: 11.4 © 9813-3870 [employer]. Care instructions adapted under license by [employer] (which disclaims liability or warranty for this information). If you have questions about a medical condition or this instruction, always ask your healthcare professional. [address] any warranty or liability for your use of this information. Introducing [facility]! Vanessa Odell introduces [employer] patient portal. Now you can access parts of your medical record, email your doctor's office, and request medication refills online. 1. In your internet browser, go to [email or web address] 2. Click on the First Time User? Click Here link in the Sign In box. You will see the New Member Sign Up page. 3. Enter your [employer] Access Code exactly as it appears below. You will not need to use this code after youve completed the sign-up process. If you do not sign up before the expiration date, you must request a new code. · [employer] Access Code: [email or web address] Expires: 3/15/2018 10:07 AM 
 
4.  Enter the last four digits of your Social Security Number (xxxx) and Date of Birth (mm/dd/yyyy) as indicated and click Submit. You will be taken to the next sign-up page. 5. Create a [employer] ID. This will be your [employer] login ID and cannot be changed, so think of one that is secure and easy to remember. 6. Create a [employer] password. You can change your password at any time. 7. Enter your Password Reset Question and Answer. This can be used at a later time if you forget your password. 8. Enter your e-mail address. You will receive e-mail notification when new information is available in [address] 9. Click Sign Up. You can now view and download portions of your medical record. 10. Click the Download Summary menu link to download a portable copy of your medical information. If you have questions, please visit the Frequently Asked Questions section of the [employer] website. Remember, [employer] is NOT to be used for urgent needs. For medical emergencies, dial 911. Now available from your iPhone and Android! Please provide this summary of care documentation to your next provider. [facility]. If you have any questions after today's visit, please call [phone].

## 2017-12-15 NOTE — PATIENT INSTRUCTIONS
There are no discontinued medications. Orders Placed This Encounter    METABOLIC PANEL, BASIC     Standing Status:   Future     Standing Expiration Date:   3/17/2018    PROGRAM EVAL (IN PERSON) IMPLANT DEVICE,PACEMAKER,MULT LEAD     Order Specific Question:   Reason for Exam:     Answer:   f/u pacer    carvedilol (COREG) 3.125 mg tablet     Sig: Take 1 Tab by mouth two (2) times daily (with meals). Hold if SBP<100     Dispense:  60 Tab     Refill:  3          Managing Other Conditions When You Have Heart Failure: Care Instructions  Your Care Instructions    All the systems in your body rely on each other to work properly. Heart failure has effects all through your body that can lead to other problems, such as kidney disease. The reverse is also true. A condition like diabetes or lung disease can damage or stress your heart and cause heart failure. Managing any other problems can help reduce your heart's workload and make your heart failure better. Conditions that commonly cause or occur along with heart failure include high blood pressure, diabetes, COPD, high cholesterol, kidney problems, anemia, and arthritis. Follow-up care is a key part of your treatment and safety. Be sure to make and go to all appointments, and call your doctor if you are having problems. It's also a good idea to know your test results and keep a list of the medicines you take. How can you care for yourself at home? Steps to help with heart failure and other problems  · Eat less salt (sodium). This helps keep fluid from building up. It may help you feel better. Limiting sodium can also help if you have high blood pressure or kidney disease. · Watch your fluid intake if your doctor tells you to. Reducing fluids can ease your heart's workload and keep your sodium level in balance. · Get regular exercise. Regular, moderate exercise, such as walking, helps your heart.  It can also help lower your blood pressure, lower stress, and help you lose weight. · Lose weight if you are overweight. Losing weight can help you manage diabetes, lower your blood pressure and cholesterol level, and reduce the workload on your heart. · Stop smoking. Smoking stresses your lungs, interferes with healing, and can make heart failure worse. · Limit alcohol. Alcohol can raise your blood pressure. Ask your doctor how much, if any, is safe. If your doctor has not set you up with a cardiac rehabilitation (rehab) program, talk to him or her about whether that is right for you. Cardiac rehab includes exercise, help with diet and lifestyle changes, and emotional support. To stay as healthy as possible  · Work closely with your doctor. Have all your tests, and go to all your appointments. · Take your medicines exactly as prescribed. You will take medicines to treat the other conditions you have along with heart failure. It can be hard to balance the treatment for all your conditions. You will need to have follow-up tests to make sure that all your medicines are working well together. Talk to your doctor if you have any problems with your medicine. · Keep all your doctors informed about your health problems and all the medicines you take for them. Medicines that can treat one condition may make another condition worse. · Talk to your doctor before you take any vitamins, over-the-counter drugs, or herbal products. Do not take aspirin, ibuprofen (Advil, Motrin), or naproxen (Aleve) unless you talk to your doctor first. They could make your heart failure and other problems worse. When should you call for help? Call 911 if you have symptoms of sudden heart failure, such as:  ? · You have severe trouble breathing. ? · You cough up pink, foamy mucus. ? · You have a new irregular or rapid heartbeat. ?Call 911 if you have symptoms of a heart attack. These may include:  ? · Chest pain or pressure, or a strange feeling in the chest.   ? · Sweating.    ? · Shortness of breath. ? · Nausea or vomiting. ? · Pain, pressure, or a strange feeling in the back, neck, jaw, or upper belly or in one or both shoulders or arms. ? · Lightheadedness or sudden weakness. ? · A fast or irregular heartbeat. ? After you call 911, the  may tell you to chew 1 adult-strength or 2 to 4 low-dose aspirin. Wait for an ambulance. Do not try to drive yourself. ?Call your doctor now or seek immediate medical care if:  ? · You have new or increased shortness of breath. ? · You are dizzy or lightheaded, or you feel like you may faint. ? · You have sudden weight gain, such as more than 2 to 3 pounds in a day or 5 pounds in a week. (Your doctor may suggest a different range of weight gain.)   ? · You have increased swelling in your legs, ankles, or feet. ? · You are suddenly so tired or weak that you cannot do your usual activities. ? Watch closely for changes in your health, and be sure to contact your doctor if you develop new symptoms. Where can you learn more? Go to http://nimco-madan.info/. Enter P052 in the search box to learn more about \"Managing Other Conditions When You Have Heart Failure: Care Instructions. \"  Current as of: September 21, 2016  Content Version: 11.4  © 6600-7513 [employer] Care instructions adapted under license by [employer] (which disclaims liability or warranty for this information). If you have questions about a medical condition or this instruction, always ask your healthcare professional. [place] [address] any warranty or liability for your use of this information.